# Patient Record
Sex: FEMALE | Race: WHITE | Employment: FULL TIME
[De-identification: names, ages, dates, MRNs, and addresses within clinical notes are randomized per-mention and may not be internally consistent; named-entity substitution may affect disease eponyms.]

---

## 2017-01-19 ENCOUNTER — OFFICE VISIT (OUTPATIENT)
Dept: FAMILY MEDICINE CLINIC | Facility: CLINIC | Age: 52
End: 2017-01-19

## 2017-01-19 VITALS
WEIGHT: 187.8 LBS | OXYGEN SATURATION: 97 % | SYSTOLIC BLOOD PRESSURE: 114 MMHG | HEIGHT: 68 IN | DIASTOLIC BLOOD PRESSURE: 70 MMHG | TEMPERATURE: 98.2 F | HEART RATE: 77 BPM | BODY MASS INDEX: 28.46 KG/M2

## 2017-01-19 DIAGNOSIS — E78.2 MIXED HYPERLIPIDEMIA: ICD-10-CM

## 2017-01-19 DIAGNOSIS — R82.998 DARK URINE: ICD-10-CM

## 2017-01-19 DIAGNOSIS — E11.39 UNCONTROLLED TYPE 2 DIABETES MELLITUS WITH OTHER OPHTHALMIC COMPLICATION, UNSPECIFIED LONG TERM INSULIN USE STATUS: Primary | ICD-10-CM

## 2017-01-19 DIAGNOSIS — E11.65 UNCONTROLLED TYPE 2 DIABETES MELLITUS WITH OTHER OPHTHALMIC COMPLICATION, UNSPECIFIED LONG TERM INSULIN USE STATUS: Primary | ICD-10-CM

## 2017-01-19 LAB
BILIRUBIN, POC: NEGATIVE
BLOOD URINE, POC: NEGATIVE
CLARITY, POC: CLEAR
COLOR, POC: YELLOW
GLUCOSE URINE, POC: >1000
HBA1C MFR BLD: 7.3 %
KETONES, POC: NEGATIVE
LEUKOCYTE EST, POC: NEGATIVE
NITRITE, POC: NEGATIVE
PH, POC: 6
PROTEIN, POC: NEGATIVE
SPECIFIC GRAVITY, POC: 1.02
UROBILINOGEN, POC: 0.2

## 2017-01-19 PROCEDURE — 83036 HEMOGLOBIN GLYCOSYLATED A1C: CPT | Performed by: NURSE PRACTITIONER

## 2017-01-19 PROCEDURE — 3045F PR MOST RECENT HEMOGLOBIN A1C LEVEL 7.0-9.0%: CPT | Performed by: NURSE PRACTITIONER

## 2017-01-19 PROCEDURE — 99213 OFFICE O/P EST LOW 20 MIN: CPT | Performed by: NURSE PRACTITIONER

## 2017-01-19 PROCEDURE — 81003 URINALYSIS AUTO W/O SCOPE: CPT | Performed by: NURSE PRACTITIONER

## 2017-01-19 RX ORDER — LANCETS 33 GAUGE
EACH MISCELLANEOUS
COMMUNITY
Start: 2016-12-06 | End: 2019-04-18

## 2017-01-19 RX ORDER — BLOOD SUGAR DIAGNOSTIC
STRIP MISCELLANEOUS
COMMUNITY
Start: 2016-12-06 | End: 2018-04-16 | Stop reason: SDUPTHER

## 2017-01-19 RX ORDER — ATORVASTATIN CALCIUM 20 MG/1
20 TABLET, FILM COATED ORAL DAILY
Qty: 30 TABLET | Refills: 3 | Status: SHIPPED | OUTPATIENT
Start: 2017-01-19 | End: 2017-06-08 | Stop reason: SDUPTHER

## 2017-01-19 ASSESSMENT — ENCOUNTER SYMPTOMS
VISUAL CHANGE: 0
DIARRHEA: 0
NAUSEA: 0
COUGH: 0
VOMITING: 0
CHEST TIGHTNESS: 0
BLURRED VISION: 0
CONSTIPATION: 0
ABDOMINAL PAIN: 0
SHORTNESS OF BREATH: 0

## 2017-06-08 ENCOUNTER — OFFICE VISIT (OUTPATIENT)
Dept: FAMILY MEDICINE CLINIC | Age: 52
End: 2017-06-08
Payer: COMMERCIAL

## 2017-06-08 VITALS
SYSTOLIC BLOOD PRESSURE: 126 MMHG | OXYGEN SATURATION: 97 % | TEMPERATURE: 98 F | WEIGHT: 189.2 LBS | HEIGHT: 68 IN | HEART RATE: 81 BPM | BODY MASS INDEX: 28.67 KG/M2 | DIASTOLIC BLOOD PRESSURE: 76 MMHG

## 2017-06-08 DIAGNOSIS — Z13.31 DEPRESSION SCREEN: ICD-10-CM

## 2017-06-08 DIAGNOSIS — E78.2 MIXED HYPERLIPIDEMIA: ICD-10-CM

## 2017-06-08 DIAGNOSIS — M25.50 ARTHRALGIA, UNSPECIFIED JOINT: ICD-10-CM

## 2017-06-08 DIAGNOSIS — E11.65 UNCONTROLLED TYPE 2 DIABETES MELLITUS WITH OTHER OPHTHALMIC COMPLICATION, UNSPECIFIED LONG TERM INSULIN USE STATUS: Primary | ICD-10-CM

## 2017-06-08 DIAGNOSIS — E11.39 UNCONTROLLED TYPE 2 DIABETES MELLITUS WITH OTHER OPHTHALMIC COMPLICATION, UNSPECIFIED LONG TERM INSULIN USE STATUS: Primary | ICD-10-CM

## 2017-06-08 LAB — HBA1C MFR BLD: 7.7 %

## 2017-06-08 PROCEDURE — 83036 HEMOGLOBIN GLYCOSYLATED A1C: CPT | Performed by: NURSE PRACTITIONER

## 2017-06-08 PROCEDURE — G8417 CALC BMI ABV UP PARAM F/U: HCPCS | Performed by: NURSE PRACTITIONER

## 2017-06-08 PROCEDURE — 99213 OFFICE O/P EST LOW 20 MIN: CPT | Performed by: NURSE PRACTITIONER

## 2017-06-08 PROCEDURE — 3045F PR MOST RECENT HEMOGLOBIN A1C LEVEL 7.0-9.0%: CPT | Performed by: NURSE PRACTITIONER

## 2017-06-08 RX ORDER — MELOXICAM 7.5 MG/1
7.5 TABLET ORAL DAILY
Qty: 30 TABLET | Refills: 3 | Status: SHIPPED | OUTPATIENT
Start: 2017-06-08 | End: 2017-10-05 | Stop reason: DRUGHIGH

## 2017-06-08 RX ORDER — ATORVASTATIN CALCIUM 20 MG/1
20 TABLET, FILM COATED ORAL DAILY
Qty: 30 TABLET | Refills: 3 | Status: SHIPPED | OUTPATIENT
Start: 2017-06-08 | End: 2017-10-05 | Stop reason: SDUPTHER

## 2017-06-08 ASSESSMENT — ENCOUNTER SYMPTOMS
SHORTNESS OF BREATH: 0
VOMITING: 0
COUGH: 0
NAUSEA: 0
ABDOMINAL PAIN: 0
BACK PAIN: 0
CHEST TIGHTNESS: 0

## 2017-06-08 ASSESSMENT — PATIENT HEALTH QUESTIONNAIRE - PHQ9
SUM OF ALL RESPONSES TO PHQ QUESTIONS 1-9: 0
2. FEELING DOWN, DEPRESSED OR HOPELESS: 0
SUM OF ALL RESPONSES TO PHQ9 QUESTIONS 1 & 2: 0
1. LITTLE INTEREST OR PLEASURE IN DOING THINGS: 0

## 2017-10-05 ENCOUNTER — OFFICE VISIT (OUTPATIENT)
Dept: FAMILY MEDICINE CLINIC | Age: 52
End: 2017-10-05
Payer: COMMERCIAL

## 2017-10-05 VITALS
OXYGEN SATURATION: 98 % | TEMPERATURE: 98.1 F | SYSTOLIC BLOOD PRESSURE: 116 MMHG | BODY MASS INDEX: 28.73 KG/M2 | HEART RATE: 81 BPM | DIASTOLIC BLOOD PRESSURE: 78 MMHG | HEIGHT: 68 IN | WEIGHT: 189.6 LBS

## 2017-10-05 DIAGNOSIS — E55.9 VITAMIN D DEFICIENCY: ICD-10-CM

## 2017-10-05 DIAGNOSIS — Z11.4 ENCOUNTER FOR SCREENING FOR HIV: ICD-10-CM

## 2017-10-05 DIAGNOSIS — Z00.00 ROUTINE GENERAL MEDICAL EXAMINATION AT A HEALTH CARE FACILITY: ICD-10-CM

## 2017-10-05 DIAGNOSIS — Z11.59 NEED FOR HEPATITIS C SCREENING TEST: ICD-10-CM

## 2017-10-05 DIAGNOSIS — E11.39 UNCONTROLLED TYPE 2 DIABETES MELLITUS WITH OTHER OPHTHALMIC COMPLICATION, UNSPECIFIED LONG TERM INSULIN USE STATUS: Primary | ICD-10-CM

## 2017-10-05 DIAGNOSIS — Z23 FLU VACCINE NEED: ICD-10-CM

## 2017-10-05 DIAGNOSIS — E11.65 UNCONTROLLED TYPE 2 DIABETES MELLITUS WITH OTHER OPHTHALMIC COMPLICATION, UNSPECIFIED LONG TERM INSULIN USE STATUS: Primary | ICD-10-CM

## 2017-10-05 DIAGNOSIS — Z23 NEED FOR SHINGLES VACCINE: ICD-10-CM

## 2017-10-05 DIAGNOSIS — E78.2 MIXED HYPERLIPIDEMIA: ICD-10-CM

## 2017-10-05 DIAGNOSIS — E11.65 UNCONTROLLED TYPE 2 DIABETES MELLITUS WITH OTHER OPHTHALMIC COMPLICATION, UNSPECIFIED LONG TERM INSULIN USE STATUS: ICD-10-CM

## 2017-10-05 DIAGNOSIS — E11.39 UNCONTROLLED TYPE 2 DIABETES MELLITUS WITH OTHER OPHTHALMIC COMPLICATION, UNSPECIFIED LONG TERM INSULIN USE STATUS: ICD-10-CM

## 2017-10-05 LAB
CREATININE URINE POCT: 50
HBA1C MFR BLD: 7.6 %
MICROALBUMIN/CREAT 24H UR: 10 MG/G{CREAT}
MICROALBUMIN/CREAT UR-RTO: NORMAL

## 2017-10-05 PROCEDURE — 99213 OFFICE O/P EST LOW 20 MIN: CPT | Performed by: NURSE PRACTITIONER

## 2017-10-05 PROCEDURE — 90630 INFLUENZA, QUADV, 18-64 YRS, ID, PF, MICRO INJ, 0.1ML (FLUZONE QUADV, PF): CPT | Performed by: NURSE PRACTITIONER

## 2017-10-05 PROCEDURE — 3045F PR MOST RECENT HEMOGLOBIN A1C LEVEL 7.0-9.0%: CPT | Performed by: NURSE PRACTITIONER

## 2017-10-05 PROCEDURE — 82044 UR ALBUMIN SEMIQUANTITATIVE: CPT | Performed by: NURSE PRACTITIONER

## 2017-10-05 PROCEDURE — 83036 HEMOGLOBIN GLYCOSYLATED A1C: CPT | Performed by: NURSE PRACTITIONER

## 2017-10-05 PROCEDURE — 90471 IMMUNIZATION ADMIN: CPT | Performed by: NURSE PRACTITIONER

## 2017-10-05 RX ORDER — MELOXICAM 15 MG/1
15 TABLET ORAL DAILY
Qty: 30 TABLET | Refills: 3 | Status: SHIPPED | OUTPATIENT
Start: 2017-10-05 | End: 2018-01-16 | Stop reason: SDUPTHER

## 2017-10-05 RX ORDER — LISINOPRIL 2.5 MG/1
2.5 TABLET ORAL DAILY
Qty: 30 TABLET | Refills: 3 | Status: SHIPPED | OUTPATIENT
Start: 2017-10-05 | End: 2018-01-16 | Stop reason: SDUPTHER

## 2017-10-05 RX ORDER — ATORVASTATIN CALCIUM 20 MG/1
20 TABLET, FILM COATED ORAL DAILY
Qty: 30 TABLET | Refills: 3 | Status: SHIPPED | OUTPATIENT
Start: 2017-10-05 | End: 2018-01-16 | Stop reason: SDUPTHER

## 2017-10-05 ASSESSMENT — ENCOUNTER SYMPTOMS
ABDOMINAL PAIN: 0
VOMITING: 0
SINUS PRESSURE: 1
COUGH: 0
RHINORRHEA: 1
SHORTNESS OF BREATH: 0
NAUSEA: 0
CHEST TIGHTNESS: 0

## 2017-10-05 NOTE — PROGRESS NOTES
microalbuminuria, without long-term current use of insulin ([facility] [address]     3. Mixed hyperlipidemia  Lipid Panel    atorvastatin (LIPITOR) 20 MG tablet   4. Vitamin D deficiency  Vitamin D 25 Hydroxy   5. Flu vaccine need  INFLUENZA, QUADV, 18-64 YRS, ID, PF, MICRO INJ, 0.1ML (FLUZONE QUADV, PF)   6. Encounter for screening for HIV  HIV Screen   7. Need for hepatitis C screening test  Hepatitis C Antibody   8. Routine general medical examination at a health care facility  Lipid Panel    CBC With Auto Differential    Comprehensive Metabolic Panel   9. Need for shingles vaccine  zoster vaccine live, PF, (ZOSTAVAX) 20019 UNT/0.65ML injection   10. 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, unspecified long term insulin use status (HCC)  Empagliflozin-Linagliptin (GLYXAMBI) 25-5 MG TABS    metFORMIN (GLUCOPHAGE) 1000 MG tablet     Results for orders placed or performed in visit on 10/05/17   POCT glycosylated hemoglobin (Hb A1C)   Result Value Ref Range    Hemoglobin A1C 7.6 %   POCT microalbumin   Result Value Ref Range    Microalb, Ur 10     Creatinine Ur POCT 50     Microalb Creat Ratio       Wt Readings from Last 3 Encounters:   10/05/17 189 lb 9.6 oz (86 kg)   06/08/17 189 lb 3.2 oz (85.8 kg)   01/19/17 187 lb 12.8 oz (85.2 kg)       Plan:      Return in about 3 months (around 1/5/2018) for return for diabetes management.   Orders Placed This Encounter   Procedures    INFLUENZA, QUADV, 18-64 YRS, ID, PF, MICRO INJ, 0.1ML (FLUZONE QUADV, PF)    Hepatitis C Antibody     Standing Status:   Future     Standing Expiration Date:   10/5/2018    HIV Screen     Standing Status:   Future     Standing Expiration Date:   10/5/2018    Lipid Panel     Standing Status:   Future     Standing Expiration Date:   10/5/2018     Order Specific Question:   Is Patient Fasting?/# of Hours     Answer:   8-10 hrs    CBC With Auto Differential     Standing Status:   Future     Standing Expiration Date: 10/5/2018    Comprehensive Metabolic Panel     Standing Status:   Future     Standing Expiration Date:   10/5/2018    Vitamin B12     Standing Status:   Future     Standing Expiration Date:   10/5/2018    Vitamin D 25 Hydroxy     Standing Status:   Future     Standing Expiration Date:   10/5/2018    POCT glycosylated hemoglobin (Hb A1C)    POCT microalbumin   pt non complaint with Diabetic diet, advised again to cut out sweets/candy  Continue same meds  Update other labs  Health Maintenance reviewed - Flu shot given  Declined mamm at this time, does not need pap  Low carb, low sugar diet, high fiber/protein  Daily exercise  Add ACE for kidney protection      Orders Placed This Encounter   Medications    zoster vaccine live, PF, (ZOSTAVAX) 84175 UNT/0.65ML injection     Sig: Inject 0.65 mLs into the skin once for 1 dose     Dispense:  0.65 mL     Refill:  0    lisinopril (ZESTRIL) 2.5 MG tablet     Sig: Take 1 tablet by mouth daily     Dispense:  30 tablet     Refill:  3    meloxicam (MOBIC) 15 MG tablet     Sig: Take 1 tablet by mouth daily     Dispense:  30 tablet     Refill:  3    Empagliflozin-Linagliptin (GLYXAMBI) 25-5 MG TABS     Sig: Take 1 tablet by mouth daily     Dispense:  30 tablet     Refill:  3    metFORMIN (GLUCOPHAGE) 1000 MG tablet     Sig: Take 1 tablet by mouth 2 times daily (with meals)     Dispense:  60 tablet     Refill:  3    atorvastatin (LIPITOR) 20 MG tablet     Sig: Take 1 tablet by mouth daily     Dispense:  30 tablet     Refill:  3       Patient given educational materials - see patient instructions. Discussed use, benefit, and side effects of prescribed medications. All patient questions answered. Pt voiced understanding. Reviewed health maintenance. Instructed to continue current medications, diet and exercise.     Electronically signed by Angelita Barber CNP on 10/5/2017 at 2:58 PM

## 2017-10-06 ENCOUNTER — HOSPITAL ENCOUNTER (OUTPATIENT)
Age: 52
Setting detail: SPECIMEN
Discharge: HOME | End: 2017-10-06
Payer: COMMERCIAL

## 2017-10-06 DIAGNOSIS — Z11.59 NEED FOR HEPATITIS C SCREENING TEST: ICD-10-CM

## 2017-10-06 DIAGNOSIS — E78.2 MIXED HYPERLIPIDEMIA: ICD-10-CM

## 2017-10-06 DIAGNOSIS — E11.39 UNCONTROLLED TYPE 2 DIABETES MELLITUS WITH OTHER OPHTHALMIC COMPLICATION, UNSPECIFIED LONG TERM INSULIN USE STATUS: ICD-10-CM

## 2017-10-06 DIAGNOSIS — E55.9 VITAMIN D DEFICIENCY: ICD-10-CM

## 2017-10-06 DIAGNOSIS — Z00.00 ROUTINE GENERAL MEDICAL EXAMINATION AT A HEALTH CARE FACILITY: ICD-10-CM

## 2017-10-06 DIAGNOSIS — Z11.4 ENCOUNTER FOR SCREENING FOR HIV: ICD-10-CM

## 2017-10-06 DIAGNOSIS — E11.65 UNCONTROLLED TYPE 2 DIABETES MELLITUS WITH OTHER OPHTHALMIC COMPLICATION, UNSPECIFIED LONG TERM INSULIN USE STATUS: ICD-10-CM

## 2017-10-06 LAB
ABSOLUTE EOS #: 0.1 K/UL (ref 0–0.4)
ABSOLUTE LYMPH #: 2.9 K/UL (ref 1–4.8)
ABSOLUTE MONO #: 0.4 K/UL (ref 0.1–1.2)
ALBUMIN SERPL-MCNC: 4.4 G/DL (ref 3.5–5.2)
ALBUMIN/GLOBULIN RATIO: 1.3 (ref 1–2.5)
ALP BLD-CCNC: 77 U/L (ref 35–104)
ALT SERPL-CCNC: 21 U/L (ref 5–33)
ANION GAP SERPL CALCULATED.3IONS-SCNC: 16 MMOL/L (ref 9–17)
AST SERPL-CCNC: 16 U/L
BASOPHILS # BLD: 0 %
BASOPHILS ABSOLUTE: 0 K/UL (ref 0–0.2)
BILIRUB SERPL-MCNC: 0.35 MG/DL (ref 0.3–1.2)
BUN BLDV-MCNC: 14 MG/DL (ref 6–20)
BUN/CREAT BLD: ABNORMAL (ref 9–20)
CALCIUM SERPL-MCNC: 9.1 MG/DL (ref 8.6–10.4)
CHLORIDE BLD-SCNC: 103 MMOL/L (ref 98–107)
CHOLESTEROL/HDL RATIO: 4.5
CHOLESTEROL: 192 MG/DL
CO2: 25 MMOL/L (ref 20–31)
CREAT SERPL-MCNC: 0.37 MG/DL (ref 0.5–0.9)
DIFFERENTIAL TYPE: NORMAL
EOSINOPHILS RELATIVE PERCENT: 2 %
GFR AFRICAN AMERICAN: >60 ML/MIN
GFR NON-AFRICAN AMERICAN: >60 ML/MIN
GFR SERPL CREATININE-BSD FRML MDRD: ABNORMAL ML/MIN/{1.73_M2}
GFR SERPL CREATININE-BSD FRML MDRD: ABNORMAL ML/MIN/{1.73_M2}
GLUCOSE BLD-MCNC: 155 MG/DL (ref 70–99)
HCT VFR BLD CALC: 42.5 % (ref 36–46)
HDLC SERPL-MCNC: 43 MG/DL
HEMOGLOBIN: 13.9 G/DL (ref 12–16)
HEPATITIS C ANTIBODY: NONREACTIVE
HIV AG/AB: NONREACTIVE
LDL CHOLESTEROL: 121 MG/DL (ref 0–130)
LYMPHOCYTES # BLD: 45 %
MCH RBC QN AUTO: 27.5 PG (ref 26–34)
MCHC RBC AUTO-ENTMCNC: 32.7 G/DL (ref 31–37)
MCV RBC AUTO: 84.2 FL (ref 80–100)
MONOCYTES # BLD: 7 %
PDW BLD-RTO: 15.4 % (ref 12.5–15.4)
PLATELET # BLD: 218 K/UL (ref 140–450)
PLATELET ESTIMATE: NORMAL
PMV BLD AUTO: 8.3 FL (ref 6–12)
POTASSIUM SERPL-SCNC: 3.9 MMOL/L (ref 3.7–5.3)
RBC # BLD: 5.05 M/UL (ref 4–5.2)
RBC # BLD: NORMAL 10*6/UL
SEG NEUTROPHILS: 46 %
SEGMENTED NEUTROPHILS ABSOLUTE COUNT: 2.9 K/UL (ref 1.8–7.7)
SODIUM BLD-SCNC: 144 MMOL/L (ref 135–144)
TOTAL PROTEIN: 7.8 G/DL (ref 6.4–8.3)
TRIGL SERPL-MCNC: 140 MG/DL
VITAMIN B-12: 393 PG/ML (ref 211–946)
VITAMIN D 25-HYDROXY: 22 NG/ML (ref 30–100)
VLDLC SERPL CALC-MCNC: NORMAL MG/DL (ref 1–30)
WBC # BLD: 6.4 K/UL (ref 3.5–11)
WBC # BLD: NORMAL 10*3/UL

## 2017-12-08 ENCOUNTER — TELEPHONE (OUTPATIENT)
Dept: FAMILY MEDICINE CLINIC | Age: 52
End: 2017-12-08

## 2017-12-08 RX ORDER — AZITHROMYCIN 250 MG/1
TABLET, FILM COATED ORAL
Qty: 1 PACKET | Refills: 0 | Status: SHIPPED | OUTPATIENT
Start: 2017-12-08 | End: 2017-12-18

## 2017-12-08 NOTE — TELEPHONE ENCOUNTER
Patient called stating she is out of town in [facility] and woke up with a cold, coughing up yellow, green mucus. She is requesting a z pack sent the the pharmacy. Please advise.  Thank you

## 2018-01-16 ENCOUNTER — OFFICE VISIT (OUTPATIENT)
Dept: FAMILY MEDICINE CLINIC | Age: 53
End: 2018-01-16
Payer: COMMERCIAL

## 2018-01-16 VITALS
OXYGEN SATURATION: 96 % | BODY MASS INDEX: 28.49 KG/M2 | SYSTOLIC BLOOD PRESSURE: 124 MMHG | HEART RATE: 114 BPM | DIASTOLIC BLOOD PRESSURE: 72 MMHG | TEMPERATURE: 100.8 F | HEIGHT: 68 IN | WEIGHT: 188 LBS

## 2018-01-16 DIAGNOSIS — R50.9 FEVER, UNSPECIFIED FEVER CAUSE: ICD-10-CM

## 2018-01-16 DIAGNOSIS — E11.65 UNCONTROLLED TYPE 2 DIABETES MELLITUS WITH OTHER OPHTHALMIC COMPLICATION, UNSPECIFIED LONG TERM INSULIN USE STATUS: ICD-10-CM

## 2018-01-16 DIAGNOSIS — J10.1 INFLUENZA A: ICD-10-CM

## 2018-01-16 DIAGNOSIS — E78.2 MIXED HYPERLIPIDEMIA: ICD-10-CM

## 2018-01-16 DIAGNOSIS — E11.39 UNCONTROLLED TYPE 2 DIABETES MELLITUS WITH OTHER OPHTHALMIC COMPLICATION, UNSPECIFIED LONG TERM INSULIN USE STATUS: ICD-10-CM

## 2018-01-16 LAB
HBA1C MFR BLD: 7.1 %
INFLUENZA A ANTIBODY: POSITIVE
INFLUENZA B ANTIBODY: NEGATIVE

## 2018-01-16 PROCEDURE — 3045F PR MOST RECENT HEMOGLOBIN A1C LEVEL 7.0-9.0%: CPT | Performed by: NURSE PRACTITIONER

## 2018-01-16 PROCEDURE — 83036 HEMOGLOBIN GLYCOSYLATED A1C: CPT | Performed by: NURSE PRACTITIONER

## 2018-01-16 PROCEDURE — 87804 INFLUENZA ASSAY W/OPTIC: CPT | Performed by: NURSE PRACTITIONER

## 2018-01-16 PROCEDURE — 99214 OFFICE O/P EST MOD 30 MIN: CPT | Performed by: NURSE PRACTITIONER

## 2018-01-16 RX ORDER — ATORVASTATIN CALCIUM 20 MG/1
20 TABLET, FILM COATED ORAL DAILY
Qty: 30 TABLET | Refills: 5 | Status: SHIPPED | OUTPATIENT
Start: 2018-01-16 | End: 2018-07-18 | Stop reason: SDUPTHER

## 2018-01-16 RX ORDER — ALBUTEROL SULFATE 90 UG/1
2 AEROSOL, METERED RESPIRATORY (INHALATION) EVERY 6 HOURS PRN
Qty: 1 INHALER | Refills: 0 | Status: SHIPPED | OUTPATIENT
Start: 2018-01-16 | End: 2019-04-18

## 2018-01-16 RX ORDER — LISINOPRIL 2.5 MG/1
2.5 TABLET ORAL DAILY
Qty: 30 TABLET | Refills: 5 | Status: SHIPPED | OUTPATIENT
Start: 2018-01-16 | End: 2018-07-18 | Stop reason: SDUPTHER

## 2018-01-16 RX ORDER — MELOXICAM 7.5 MG/1
TABLET ORAL
COMMUNITY
Start: 2017-10-10 | End: 2018-01-16 | Stop reason: DRUGHIGH

## 2018-01-16 RX ORDER — OSELTAMIVIR PHOSPHATE 75 MG/1
75 CAPSULE ORAL 2 TIMES DAILY
Qty: 10 CAPSULE | Refills: 0 | Status: SHIPPED | OUTPATIENT
Start: 2018-01-16 | End: 2018-01-21

## 2018-01-16 RX ORDER — MELOXICAM 15 MG/1
15 TABLET ORAL DAILY
Qty: 30 TABLET | Refills: 5 | Status: SHIPPED | OUTPATIENT
Start: 2018-01-16 | End: 2018-07-18 | Stop reason: SDUPTHER

## 2018-01-16 ASSESSMENT — ENCOUNTER SYMPTOMS
NAUSEA: 0
CHEST TIGHTNESS: 1
RHINORRHEA: 0
WHEEZING: 0
COLOR CHANGE: 0
COUGH: 1
SHORTNESS OF BREATH: 1
SINUS PRESSURE: 0
VOMITING: 0
SINUS PAIN: 0
BLURRED VISION: 0

## 2018-01-16 NOTE — PROGRESS NOTES
decreasing steadily. An ACE inhibitor/angiotensin II receptor blocker is being taken. She does not see a podiatrist.Eye exam is not current. Past Medical History:   Diagnosis Date    Hyperlipidemia     Neuropathy ([address]     Type 2 diabetes mellitus (HCC)       Past Surgical History:   Procedure Laterality Date    HYSTERECTOMY      complete     Family History   Problem Relation Age of Onset    Diabetes Father     Cancer Father      prostate, skin, rectal    Diabetes Brother     Diabetes Paternal Aunt      Social History   Substance Use Topics    Smoking status: Former Smoker    Smokeless tobacco: Not on file    Alcohol use No      Current Outpatient Prescriptions   Medication Sig Dispense Refill    oseltamivir (TAMIFLU) 75 MG capsule Take 1 capsule by mouth 2 times daily for 5 days 10 capsule 0    lisinopril (ZESTRIL) 2.5 MG tablet Take 1 tablet by mouth daily 30 tablet 5    meloxicam (MOBIC) 15 MG tablet Take 1 tablet by mouth daily 30 tablet 5    Empagliflozin-Linagliptin (GLYXAMBI) 25-5 MG TABS Take 1 tablet by mouth daily 30 tablet 5    metFORMIN (GLUCOPHAGE) 1000 MG tablet Take 1 tablet by mouth 2 times daily (with meals) 60 tablet 5    atorvastatin (LIPITOR) 20 MG tablet Take 1 tablet by mouth daily 30 tablet 5    albuterol sulfate HFA (VENTOLIN HFA) 108 (90 Base) MCG/ACT inhaler Inhale 2 puffs into the lungs every 6 hours as needed for Wheezing 1 Inhaler 0    ONETOUCH DELICA LANCETS 85A MISC       ONETOUCH VERIO strip       vitamin D (ERGOCALCIFEROL) 82988 UNITS CAPS capsule Take 1 capsule by mouth once a week for 8 doses 8 capsule 0     No current facility-administered medications for this visit.       No Known Allergies    Health Maintenance   Topic Date Due    Diabetic retinal exam  10/24/2017    Diabetic foot exam  11/04/2017    Cervical cancer screen  01/30/2019 (Originally 6/27/1985)    DTaP/Tdap/Td vaccine (1 - Tdap) 10/26/2020 (Originally 6/27/1983)    A1C test (Diabetic or Prediabetic)  10/05/2018    Diabetic microalbuminuria test  10/05/2018    Lipid screen  10/06/2018    Potassium monitoring  10/06/2018    Creatinine monitoring  10/06/2018    Breast cancer screen  10/18/2018    Colon cancer screen colonoscopy  11/15/2026    Flu vaccine  Completed    Pneumococcal med risk  Completed    Hepatitis C screen  Completed    HIV screen  Completed       Subjective:      Review of Systems   Constitutional: Positive for activity change, appetite change, chills, fatigue and fever. HENT: Negative for congestion, postnasal drip, rhinorrhea, sinus pain and sinus pressure. Eyes: Negative for blurred vision. Respiratory: Positive for cough, chest tightness and shortness of breath. Negative for wheezing. Cardiovascular: Negative for chest pain. Gastrointestinal: Negative for nausea and vomiting. Endocrine: Negative for polydipsia, polyphagia and polyuria. Genitourinary: Negative for impotence. Musculoskeletal: Positive for myalgias. Skin: Negative for color change and wound. Neurological: Positive for weakness and headaches. Negative for dizziness and light-headedness. Psychiatric/Behavioral: Positive for sleep disturbance. The patient is not nervous/anxious. Objective:     Physical Exam   Constitutional: She is oriented to person, place, and time. She appears well-developed and well-nourished. No distress. /72 (Site: Left Arm, Position: Sitting, Cuff Size: Medium Adult)   Pulse 114   Temp 100.8 °F (38.2 °C) (Tympanic)   Ht 5' 8\" (1.727 m)   Wt 188 lb (85.3 kg)   SpO2 96%   BMI 28.59 kg/m²      HENT:   Head: Normocephalic and atraumatic. Right Ear: Hearing, tympanic membrane, external ear and ear canal normal.   Left Ear: Hearing, tympanic membrane, external ear and ear canal normal.   Nose: Nose normal. Right sinus exhibits no maxillary sinus tenderness and no frontal sinus tenderness.  Left sinus exhibits no maxillary sinus tenderness and

## 2018-01-16 NOTE — PROGRESS NOTES
Visit Information    Have you changed or started any medications since your last visit including any over-the-counter medicines, vitamins, or herbal medicines? no   Have you stopped taking any of your medications? Is so, why? -  no  Are you having any side effects from any of your medications? - no    Have you seen any other physician or provider since your last visit?  no   Have you had any other diagnostic tests since your last visit?  no   Have you been seen in the emergency room and/or had an admission in a hospital since we last saw you?  no   Have you had your routine dental cleaning in the past 6 months?  no     Do you have an active MyChart account? If no, what is the barrier?   No: na    Patient Care Team:  Sylvain Lawler CNP as PCP - General (Nurse Practitioner)    Medical History Review  Past Medical, Family, and Social History reviewed and  contribute to the patient presenting condition    Health Maintenance   Topic Date Due    Diabetic retinal exam  10/24/2017    Diabetic foot exam  11/04/2017    Cervical cancer screen  01/30/2019 (Originally 6/27/1985)    DTaP/Tdap/Td vaccine (1 - Tdap) 10/26/2020 (Originally 6/27/1983)    A1C test (Diabetic or Prediabetic)  10/05/2018    Diabetic microalbuminuria test  10/05/2018    Lipid screen  10/06/2018    Potassium monitoring  10/06/2018    Creatinine monitoring  10/06/2018    Breast cancer screen  10/18/2018    Colon cancer screen colonoscopy  11/15/2026    Flu vaccine  Completed    Pneumococcal med risk  Completed    Hepatitis C screen  Completed    HIV screen  Completed

## 2018-01-19 ENCOUNTER — TELEPHONE (OUTPATIENT)
Dept: FAMILY MEDICINE CLINIC | Age: 53
End: 2018-01-19

## 2018-01-19 RX ORDER — DEXTROMETHORPHAN HYDROBROMIDE AND PROMETHAZINE HYDROCHLORIDE 15; 6.25 MG/5ML; MG/5ML
5 SYRUP ORAL 4 TIMES DAILY PRN
Qty: 240 ML | Refills: 0 | Status: SHIPPED | OUTPATIENT
Start: 2018-01-19 | End: 2018-01-26

## 2018-01-23 ENCOUNTER — OFFICE VISIT (OUTPATIENT)
Dept: FAMILY MEDICINE CLINIC | Age: 53
End: 2018-01-23
Payer: COMMERCIAL

## 2018-01-23 VITALS
DIASTOLIC BLOOD PRESSURE: 72 MMHG | SYSTOLIC BLOOD PRESSURE: 126 MMHG | WEIGHT: 188.4 LBS | HEIGHT: 68 IN | OXYGEN SATURATION: 98 % | TEMPERATURE: 97.5 F | BODY MASS INDEX: 28.55 KG/M2 | HEART RATE: 79 BPM

## 2018-01-23 DIAGNOSIS — R05.9 COUGH: ICD-10-CM

## 2018-01-23 DIAGNOSIS — J20.9 ACUTE BRONCHITIS, UNSPECIFIED ORGANISM: Primary | ICD-10-CM

## 2018-01-23 PROCEDURE — 99213 OFFICE O/P EST LOW 20 MIN: CPT | Performed by: NURSE PRACTITIONER

## 2018-01-23 RX ORDER — PREDNISONE 10 MG/1
10 TABLET ORAL 2 TIMES DAILY
Qty: 10 TABLET | Refills: 0 | Status: SHIPPED | OUTPATIENT
Start: 2018-01-23 | End: 2018-01-28

## 2018-01-23 ASSESSMENT — ENCOUNTER SYMPTOMS
CHEST TIGHTNESS: 1
SORE THROAT: 0
WHEEZING: 1
SHORTNESS OF BREATH: 1
CHOKING: 0
COUGH: 1
RHINORRHEA: 0
ABDOMINAL PAIN: 0
TROUBLE SWALLOWING: 0
SINUS PRESSURE: 0

## 2018-01-23 NOTE — PROGRESS NOTES
Negative for myalgias. Neurological: Negative for dizziness, weakness, light-headedness and headaches. Hematological: Negative for adenopathy. Psychiatric/Behavioral: Positive for sleep disturbance. Objective:     Physical Exam   Constitutional: She is oriented to person, place, and time. She appears well-developed and well-nourished. No distress. /72 (Site: Left Arm, Position: Sitting, Cuff Size: Medium Adult)   Pulse 79   Temp 97.5 °F (36.4 °C) (Tympanic)   Ht 5' 8\" (1.727 m)   Wt 188 lb 6.4 oz (85.5 kg)   SpO2 98%   BMI 28.65 kg/m²      HENT:   Head: Normocephalic and atraumatic. Right Ear: Hearing, tympanic membrane, external ear and ear canal normal.   Left Ear: Hearing, tympanic membrane, external ear and ear canal normal.   Nose: Nose normal. Right sinus exhibits no maxillary sinus tenderness and no frontal sinus tenderness. Left sinus exhibits no maxillary sinus tenderness and no frontal sinus tenderness. Mouth/Throat: Uvula is midline, oropharynx is clear and moist and mucous membranes are normal. No oropharyngeal exudate. Neck: Normal range of motion. Neck supple. Cardiovascular: Normal rate, regular rhythm and normal heart sounds. Pulmonary/Chest: Effort normal and breath sounds normal. No respiratory distress. She has no wheezes. Lymphadenopathy:     She has no cervical adenopathy. Neurological: She is alert and oriented to person, place, and time. Skin: Skin is warm and dry. No rash noted. She is not diaphoretic. Psychiatric: She has a normal mood and affect. Her behavior is normal. Judgment and thought content normal.   Nursing note and vitals reviewed. Assessment:      1. Acute bronchitis, unspecified organism  predniSONE (DELTASONE) 10 MG tablet   2. Cough  XR CHEST STANDARD (2 VW)    predniSONE (DELTASONE) 10 MG tablet     Cxr: reviewed today and no acute changes   will await for final rad report    Plan:      No Follow-up on file.   Orders Placed This Encounter   Procedures    XR CHEST STANDARD (2 VW)     Order Specific Question:   Reason for exam:     Answer:   cough, recent influenza, sob     Orders Placed This Encounter   Medications    predniSONE (DELTASONE) 10 MG tablet     Sig: Take 1 tablet by mouth 2 times daily for 5 days     Dispense:  10 tablet     Refill:  0   push fluids, rest  Continue albuterol  Cool mist vaporizer  Steroids for underlying bronchitis  Call INB or worsening  Frequent handwashing      Patient given educational materials - see patient instructions. Discussed use, benefit, and side effects of prescribed medications. All patient questions answered. Pt voiced understanding. Reviewed health maintenance. Instructed to continue current medications, diet and exercise.     Electronically signed by Ramirez Barber CNP on 1/23/2018 at 9:40 AM

## 2018-04-16 ENCOUNTER — OFFICE VISIT (OUTPATIENT)
Dept: FAMILY MEDICINE CLINIC | Age: 53
End: 2018-04-16
Payer: COMMERCIAL

## 2018-04-16 VITALS
OXYGEN SATURATION: 98 % | HEART RATE: 93 BPM | BODY MASS INDEX: 28.79 KG/M2 | TEMPERATURE: 98.4 F | WEIGHT: 190 LBS | DIASTOLIC BLOOD PRESSURE: 68 MMHG | HEIGHT: 68 IN | SYSTOLIC BLOOD PRESSURE: 112 MMHG

## 2018-04-16 DIAGNOSIS — M54.32 LEFT SIDED SCIATICA: ICD-10-CM

## 2018-04-16 LAB — HBA1C MFR BLD: 7.8 %

## 2018-04-16 PROCEDURE — 83036 HEMOGLOBIN GLYCOSYLATED A1C: CPT | Performed by: NURSE PRACTITIONER

## 2018-04-16 PROCEDURE — 3045F PR MOST RECENT HEMOGLOBIN A1C LEVEL 7.0-9.0%: CPT | Performed by: NURSE PRACTITIONER

## 2018-04-16 PROCEDURE — 99213 OFFICE O/P EST LOW 20 MIN: CPT | Performed by: NURSE PRACTITIONER

## 2018-04-16 PROCEDURE — 96372 THER/PROPH/DIAG INJ SC/IM: CPT | Performed by: NURSE PRACTITIONER

## 2018-04-16 RX ORDER — BLOOD SUGAR DIAGNOSTIC
1 STRIP MISCELLANEOUS DAILY
Qty: 100 EACH | Refills: 1 | Status: SHIPPED | OUTPATIENT
Start: 2018-04-16 | End: 2019-07-22 | Stop reason: SDUPTHER

## 2018-04-16 RX ORDER — TRIAMCINOLONE ACETONIDE 40 MG/ML
60 INJECTION, SUSPENSION INTRA-ARTICULAR; INTRAMUSCULAR ONCE
Status: COMPLETED | OUTPATIENT
Start: 2018-04-16 | End: 2018-04-16

## 2018-04-16 RX ADMIN — TRIAMCINOLONE ACETONIDE 60 MG: 40 INJECTION, SUSPENSION INTRA-ARTICULAR; INTRAMUSCULAR at 11:28

## 2018-04-16 ASSESSMENT — ENCOUNTER SYMPTOMS
NAUSEA: 0
COUGH: 0
SHORTNESS OF BREATH: 0
CHEST TIGHTNESS: 0
VOMITING: 0
BACK PAIN: 1
ABDOMINAL PAIN: 0

## 2018-07-18 ENCOUNTER — OFFICE VISIT (OUTPATIENT)
Dept: FAMILY MEDICINE CLINIC | Age: 53
End: 2018-07-18
Payer: COMMERCIAL

## 2018-07-18 VITALS
DIASTOLIC BLOOD PRESSURE: 76 MMHG | TEMPERATURE: 98.4 F | BODY MASS INDEX: 29.1 KG/M2 | SYSTOLIC BLOOD PRESSURE: 124 MMHG | HEART RATE: 86 BPM | HEIGHT: 68 IN | WEIGHT: 192 LBS | OXYGEN SATURATION: 98 %

## 2018-07-18 DIAGNOSIS — M25.512 CHRONIC PAIN OF BOTH SHOULDERS: ICD-10-CM

## 2018-07-18 DIAGNOSIS — E78.2 MIXED HYPERLIPIDEMIA: ICD-10-CM

## 2018-07-18 DIAGNOSIS — Z12.39 SCREENING FOR BREAST CANCER: ICD-10-CM

## 2018-07-18 DIAGNOSIS — E11.39 UNCONTROLLED TYPE 2 DIABETES MELLITUS WITH OTHER OPHTHALMIC COMPLICATION, UNSPECIFIED LONG TERM INSULIN USE STATUS: ICD-10-CM

## 2018-07-18 DIAGNOSIS — G89.29 CHRONIC PAIN OF BOTH SHOULDERS: ICD-10-CM

## 2018-07-18 DIAGNOSIS — E11.65 UNCONTROLLED TYPE 2 DIABETES MELLITUS WITH OTHER OPHTHALMIC COMPLICATION, UNSPECIFIED LONG TERM INSULIN USE STATUS: ICD-10-CM

## 2018-07-18 DIAGNOSIS — M54.32 LEFT SIDED SCIATICA: ICD-10-CM

## 2018-07-18 DIAGNOSIS — M25.511 CHRONIC PAIN OF BOTH SHOULDERS: ICD-10-CM

## 2018-07-18 LAB — HBA1C MFR BLD: 7.3 %

## 2018-07-18 PROCEDURE — 3045F PR MOST RECENT HEMOGLOBIN A1C LEVEL 7.0-9.0%: CPT | Performed by: NURSE PRACTITIONER

## 2018-07-18 PROCEDURE — 96372 THER/PROPH/DIAG INJ SC/IM: CPT | Performed by: NURSE PRACTITIONER

## 2018-07-18 PROCEDURE — 99214 OFFICE O/P EST MOD 30 MIN: CPT | Performed by: NURSE PRACTITIONER

## 2018-07-18 PROCEDURE — 83036 HEMOGLOBIN GLYCOSYLATED A1C: CPT | Performed by: NURSE PRACTITIONER

## 2018-07-18 RX ORDER — LISINOPRIL 2.5 MG/1
2.5 TABLET ORAL DAILY
Qty: 30 TABLET | Refills: 5 | Status: SHIPPED | OUTPATIENT
Start: 2018-07-18 | End: 2019-01-21 | Stop reason: SDUPTHER

## 2018-07-18 RX ORDER — TRIAMCINOLONE ACETONIDE 40 MG/ML
60 INJECTION, SUSPENSION INTRA-ARTICULAR; INTRAMUSCULAR ONCE
Status: COMPLETED | OUTPATIENT
Start: 2018-07-18 | End: 2018-07-18

## 2018-07-18 RX ORDER — ATORVASTATIN CALCIUM 20 MG/1
20 TABLET, FILM COATED ORAL DAILY
Qty: 30 TABLET | Refills: 5 | Status: SHIPPED | OUTPATIENT
Start: 2018-07-18 | End: 2019-01-21 | Stop reason: SDUPTHER

## 2018-07-18 RX ORDER — MELOXICAM 15 MG/1
15 TABLET ORAL DAILY
Qty: 30 TABLET | Refills: 5 | Status: SHIPPED | OUTPATIENT
Start: 2018-07-18 | End: 2019-01-21 | Stop reason: SDUPTHER

## 2018-07-18 RX ADMIN — TRIAMCINOLONE ACETONIDE 60 MG: 40 INJECTION, SUSPENSION INTRA-ARTICULAR; INTRAMUSCULAR at 14:05

## 2018-07-18 ASSESSMENT — ENCOUNTER SYMPTOMS
BOWEL INCONTINENCE: 0
NAUSEA: 0
CHEST TIGHTNESS: 0
COUGH: 0
DIARRHEA: 0
CONSTIPATION: 0
ABDOMINAL PAIN: 0
BACK PAIN: 1
VOMITING: 0
SHORTNESS OF BREATH: 0

## 2018-07-18 ASSESSMENT — PATIENT HEALTH QUESTIONNAIRE - PHQ9
1. LITTLE INTEREST OR PLEASURE IN DOING THINGS: 1
SUM OF ALL RESPONSES TO PHQ QUESTIONS 1-9: 2
SUM OF ALL RESPONSES TO PHQ9 QUESTIONS 1 & 2: 2
2. FEELING DOWN, DEPRESSED OR HOPELESS: 1

## 2018-07-18 NOTE — PROGRESS NOTES
Visit Information    Have you changed or started any medications since your last visit including any over-the-counter medicines, vitamins, or herbal medicines? no   Have you stopped taking any of your medications? Is so, why? -  no  Are you having any side effects from any of your medications? - no    Have you seen any other physician or provider since your last visit?  no   Have you had any other diagnostic tests since your last visit?  no   Have you been seen in the emergency room and/or had an admission in a hospital since we last saw you?  no   Have you had your routine dental cleaning in the past 6 months?  no     Do you have an active MyChart account? If no, what is the barrier? Yes    Patient Care Team:  LORNA Cooper - CNP as PCP - General (Nurse Practitioner)    Medical History Review  Past Medical, Family, and Social History reviewed and  contribute to the patient presenting condition    Health Maintenance   Topic Date Due    Shingles Vaccine (1 of 2 - 2 Dose Series) 06/27/2014    Cervical cancer screen  01/30/2019 (Originally 6/27/1985)    DTaP/Tdap/Td vaccine (1 - Tdap) 10/26/2020 (Originally 6/27/1983)    Flu vaccine (1) 09/01/2018    Diabetic microalbuminuria test  10/05/2018    Lipid screen  10/06/2018    Potassium monitoring  10/06/2018    Creatinine monitoring  10/06/2018    Breast cancer screen  10/18/2018    Diabetic retinal exam  03/05/2019    Diabetic foot exam  04/16/2019    A1C test (Diabetic or Prediabetic)  04/16/2019    Colon cancer screen colonoscopy  11/15/2026    Pneumococcal med risk  Completed    Hepatitis C screen  Completed    HIV screen  Completed     After obtaining consent, and per orders of Julisa Barber CNP, injection of Kenalog given in Left upper quad. gluteus by Inés Arias. Patient instructed to remain in clinic for 20 minutes afterwards, and to report any adverse reaction to me immediately.

## 2018-07-18 NOTE — PROGRESS NOTES
[address], [address]  [phone]      Saroj Pandey is a 46 y.o. female who presents today for her  medical conditions/complaints as noted below. Saroj Pandey is c/o of Diabetes (less motivated lately,very tired)  . HPI:   Would like refills on all meds, tolerating all well with no side effects. Diabetes   She presents for her follow-up diabetic visit. She has type 2 diabetes mellitus. Her disease course has been stable. There are no hypoglycemic associated symptoms. Pertinent negatives for hypoglycemia include no dizziness, headaches or nervousness/anxiousness. Associated symptoms include fatigue. Pertinent negatives for diabetes include no chest pain, no polydipsia, no polyphagia, no polyuria, no weakness and no weight loss. There are no hypoglycemic complications. Pertinent negatives for diabetic complications include no CVA, heart disease, nephropathy, peripheral neuropathy or retinopathy. Risk factors for coronary artery disease include sedentary lifestyle, post-menopausal, obesity and dyslipidemia. Current diabetic treatment includes oral agent (triple therapy) and diet. She is compliant with treatment all of the time. Her weight is stable. She is following a generally healthy diet. When asked about meal planning, she reported none. She participates in exercise intermittently. An ACE inhibitor/angiotensin II receptor blocker is being taken. She does not see a podiatrist.Eye exam is current. Back Pain   This is a recurrent problem. The current episode started 1 to 4 weeks ago. The problem occurs constantly. The problem has been waxing and waning since onset. The pain is present in the sacro-iliac. The quality of the pain is described as aching, burning and shooting. The pain radiates to the left knee. The pain is moderate. The pain is the same all the time. The symptoms are aggravated by twisting, standing, lying down and position. Associated symptoms include numbness (left knee) and tingling. Pertinent negatives include no abdominal pain, bladder incontinence, bowel incontinence, chest pain, dysuria, fever, headaches, paresis, paresthesias, pelvic pain, weakness or weight loss. Risk factors include sedentary lifestyle and lack of exercise. She has tried home exercises, analgesics, ice, NSAIDs and walking for the symptoms. The treatment provided no relief (did have steroid inj last OV and helped alot).        Past Medical History:   Diagnosis Date    Chronic pain of both shoulders 7/18/2018    Hyperlipidemia     Left sided sciatica 7/18/2018    Neuropathy (HCC)     Type 2 diabetes mellitus (HCC)       Past Surgical History:   Procedure Laterality Date    HYSTERECTOMY      complete     Family History   Problem Relation Age of Onset    No Known Problems Mother     Diabetes Father     Cancer Father         prostate, skin, rectal    Diabetes Brother     Diabetes Paternal Aunt      Social History   Substance Use Topics    Smoking status: Former Smoker    Smokeless tobacco: Never Used    Alcohol use No      Current Outpatient Prescriptions   Medication Sig Dispense Refill    lisinopril (ZESTRIL) 2.5 MG tablet Take 1 tablet by mouth daily 30 tablet 5    meloxicam (MOBIC) 15 MG tablet Take 1 tablet by mouth daily 30 tablet 5    Empagliflozin-Linagliptin (GLYXAMBI) 25-5 MG TABS Take 1 tablet by mouth daily 30 tablet 5    metFORMIN (GLUCOPHAGE) 1000 MG tablet Take 1 tablet by mouth 2 times daily (with meals) 60 tablet 5    atorvastatin (LIPITOR) 20 MG tablet Take 1 tablet by mouth daily 30 tablet 5    ONETOUCH VERIO strip Inject 1 each into the skin daily 100 each 1    albuterol sulfate HFA (VENTOLIN HFA) 108 (90 Base) MCG/ACT inhaler Inhale 2 puffs into the lungs every 6 hours as needed for Wheezing 1 Inhaler 0    ONETOUCH DELICA LANCETS 32M MISC       vitamin D (ERGOCALCIFEROL) 22569 UNITS CAPS capsule Take 1 capsule by mouth MG tablet   5. Mixed hyperlipidemia  atorvastatin (LIPITOR) 20 MG tablet   6. Chronic pain of both shoulders  meloxicam (MOBIC) 15 MG tablet       Chemistry        Component Value Date/Time     10/06/2017 0951    K 3.9 10/06/2017 0951     10/06/2017 0951    CO2 25 10/06/2017 0951    BUN 14 10/06/2017 0951    CREATININE 0.37 (L) 10/06/2017 0951        Component Value Date/Time    CALCIUM 9.1 10/06/2017 0951    ALKPHOS 77 10/06/2017 0951    AST 16 10/06/2017 0951    ALT 21 10/06/2017 0951    BILITOT 0.35 10/06/2017 0951        Wt Readings from Last 3 Encounters:   07/18/18 192 lb (87.1 kg)   04/16/18 190 lb (86.2 kg)   01/23/18 188 lb 6.4 oz (85.5 kg)     Results for orders placed or performed in visit on 07/18/18   POCT glycosylated hemoglobin (Hb A1C)   Result Value Ref Range    Hemoglobin A1C 7.3 %       Plan:      Return in about 3 months (around 10/18/2018) for return for diabetes management.   Orders Placed This Encounter   Procedures    MILES DIGITAL SCREEN W CAD BILATERAL     Standing Status:   Future     Standing Expiration Date:   9/17/2019     Order Specific Question:   Reason for exam:     Answer:   screening    POCT glycosylated hemoglobin (Hb A1C)     Orders Placed This Encounter   Medications    triamcinolone acetonide (KENALOG-40) injection 60 mg    lisinopril (ZESTRIL) 2.5 MG tablet     Sig: Take 1 tablet by mouth daily     Dispense:  30 tablet     Refill:  5    meloxicam (MOBIC) 15 MG tablet     Sig: Take 1 tablet by mouth daily     Dispense:  30 tablet     Refill:  5    Empagliflozin-Linagliptin (GLYXAMBI) 25-5 MG TABS     Sig: Take 1 tablet by mouth daily     Dispense:  30 tablet     Refill:  5    metFORMIN (GLUCOPHAGE) 1000 MG tablet     Sig: Take 1 tablet by mouth 2 times daily (with meals)     Dispense:  60 tablet     Refill:  5    atorvastatin (LIPITOR) 20 MG tablet     Sig: Take 1 tablet by mouth daily     Dispense:  30 tablet     Refill:  5     DM:  Uncontrolled but

## 2018-08-06 ENCOUNTER — HOSPITAL ENCOUNTER (OUTPATIENT)
Dept: MAMMOGRAPHY | Facility: CLINIC | Age: 53
Discharge: HOME | End: 2018-08-08
Payer: COMMERCIAL

## 2018-08-06 DIAGNOSIS — Z12.39 SCREENING FOR BREAST CANCER: ICD-10-CM

## 2018-08-06 PROCEDURE — 77067 SCR MAMMO BI INCL CAD: CPT

## 2019-01-21 ENCOUNTER — OFFICE VISIT (OUTPATIENT)
Dept: FAMILY MEDICINE CLINIC | Age: 54
End: 2019-01-21
Payer: COMMERCIAL

## 2019-01-21 VITALS
HEIGHT: 68 IN | SYSTOLIC BLOOD PRESSURE: 124 MMHG | OXYGEN SATURATION: 98 % | HEART RATE: 82 BPM | WEIGHT: 190 LBS | TEMPERATURE: 98 F | DIASTOLIC BLOOD PRESSURE: 75 MMHG | BODY MASS INDEX: 28.79 KG/M2

## 2019-01-21 DIAGNOSIS — E55.9 VITAMIN D DEFICIENCY: ICD-10-CM

## 2019-01-21 DIAGNOSIS — M25.512 CHRONIC PAIN OF BOTH SHOULDERS: ICD-10-CM

## 2019-01-21 DIAGNOSIS — G89.29 CHRONIC PAIN OF BOTH SHOULDERS: ICD-10-CM

## 2019-01-21 DIAGNOSIS — R30.0 DYSURIA: ICD-10-CM

## 2019-01-21 DIAGNOSIS — E11.65 UNCONTROLLED TYPE 2 DIABETES MELLITUS WITH HYPERGLYCEMIA (HCC): Primary | ICD-10-CM

## 2019-01-21 DIAGNOSIS — E78.2 MIXED HYPERLIPIDEMIA: ICD-10-CM

## 2019-01-21 DIAGNOSIS — M25.511 CHRONIC PAIN OF BOTH SHOULDERS: ICD-10-CM

## 2019-01-21 DIAGNOSIS — M75.22 BICEPS TENDINITIS OF LEFT UPPER EXTREMITY: ICD-10-CM

## 2019-01-21 LAB
BILIRUBIN, POC: NEGATIVE
BLOOD URINE, POC: NEGATIVE
CLARITY, POC: CLEAR
COLOR, POC: YELLOW
CREATININE URINE POCT: 50
GLUCOSE URINE, POC: >=1000
HBA1C MFR BLD: 7.5 %
KETONES, POC: NEGATIVE
LEUKOCYTE EST, POC: NEGATIVE
MICROALBUMIN/CREAT 24H UR: 30 MG/G{CREAT}
MICROALBUMIN/CREAT UR-RTO: NORMAL
NITRITE, POC: NEGATIVE
PH, POC: 5
PROTEIN, POC: NEGATIVE
SPECIFIC GRAVITY, POC: 1.01
UROBILINOGEN, POC: 0.2

## 2019-01-21 PROCEDURE — 82044 UR ALBUMIN SEMIQUANTITATIVE: CPT | Performed by: NURSE PRACTITIONER

## 2019-01-21 PROCEDURE — 81003 URINALYSIS AUTO W/O SCOPE: CPT | Performed by: NURSE PRACTITIONER

## 2019-01-21 PROCEDURE — 3045F PR MOST RECENT HEMOGLOBIN A1C LEVEL 7.0-9.0%: CPT | Performed by: NURSE PRACTITIONER

## 2019-01-21 PROCEDURE — 99214 OFFICE O/P EST MOD 30 MIN: CPT | Performed by: NURSE PRACTITIONER

## 2019-01-21 PROCEDURE — 83036 HEMOGLOBIN GLYCOSYLATED A1C: CPT | Performed by: NURSE PRACTITIONER

## 2019-01-21 RX ORDER — ATORVASTATIN CALCIUM 20 MG/1
20 TABLET, FILM COATED ORAL DAILY
Qty: 30 TABLET | Refills: 5 | Status: SHIPPED | OUTPATIENT
Start: 2019-01-21 | End: 2019-07-26 | Stop reason: SDUPTHER

## 2019-01-21 RX ORDER — LISINOPRIL 2.5 MG/1
2.5 TABLET ORAL DAILY
Qty: 30 TABLET | Refills: 5 | Status: SHIPPED | OUTPATIENT
Start: 2019-01-21 | End: 2019-07-26 | Stop reason: SDUPTHER

## 2019-01-21 RX ORDER — MELOXICAM 15 MG/1
15 TABLET ORAL DAILY
Qty: 30 TABLET | Refills: 5 | Status: ON HOLD | OUTPATIENT
Start: 2019-01-21 | End: 2019-05-22 | Stop reason: HOSPADM

## 2019-01-21 RX ORDER — GLIMEPIRIDE 2 MG/1
2 TABLET ORAL EVERY MORNING
Qty: 30 TABLET | Refills: 2 | Status: SHIPPED | OUTPATIENT
Start: 2019-01-21 | End: 2019-04-16 | Stop reason: SDUPTHER

## 2019-01-21 ASSESSMENT — ENCOUNTER SYMPTOMS
VOMITING: 0
COLOR CHANGE: 0
NAUSEA: 0
SHORTNESS OF BREATH: 0
ABDOMINAL PAIN: 0
COUGH: 0
CHEST TIGHTNESS: 0
BACK PAIN: 0

## 2019-01-24 ENCOUNTER — HOSPITAL ENCOUNTER (OUTPATIENT)
Dept: GENERAL RADIOLOGY | Facility: CLINIC | Age: 54
Discharge: HOME | End: 2019-01-26
Payer: COMMERCIAL

## 2019-01-24 ENCOUNTER — OFFICE VISIT (OUTPATIENT)
Dept: ORTHOPEDIC SURGERY | Age: 54
End: 2019-01-24
Payer: COMMERCIAL

## 2019-01-24 VITALS
SYSTOLIC BLOOD PRESSURE: 117 MMHG | DIASTOLIC BLOOD PRESSURE: 82 MMHG | HEART RATE: 76 BPM | WEIGHT: 190 LBS | HEIGHT: 68 IN | BODY MASS INDEX: 28.79 KG/M2

## 2019-01-24 DIAGNOSIS — M75.22 BICEPS TENDONITIS ON LEFT: ICD-10-CM

## 2019-01-24 DIAGNOSIS — M75.102 ROTATOR CUFF SYNDROME OF LEFT SHOULDER: Primary | ICD-10-CM

## 2019-01-24 LAB
ALBUMIN SERPL-MCNC: 4.4 G/DL
ALP BLD-CCNC: 71 U/L
ALT SERPL-CCNC: 18 U/L
ANION GAP SERPL CALCULATED.3IONS-SCNC: 12 MMOL/L
AST SERPL-CCNC: 17 U/L
BASOPHILS ABSOLUTE: 0 /ΜL
BASOPHILS RELATIVE PERCENT: 0.5 %
BILIRUB SERPL-MCNC: 0.5 MG/DL (ref 0.1–1.4)
BUN BLDV-MCNC: 15 MG/DL
CALCIUM SERPL-MCNC: 9.7 MG/DL
CHLORIDE BLD-SCNC: 102 MMOL/L
CHOLESTEROL, TOTAL: 146 MG/DL
CHOLESTEROL/HDL RATIO: 3.9
CO2: 26 MMOL/L
CREAT SERPL-MCNC: 0.6 MG/DL
EOSINOPHILS ABSOLUTE: 0.2 /ΜL
EOSINOPHILS RELATIVE PERCENT: 2.7 %
GFR CALCULATED: >60
GLUCOSE BLD-MCNC: 171 MG/DL
HCT VFR BLD CALC: 43.8 % (ref 36–46)
HDLC SERPL-MCNC: 37 MG/DL (ref 35–70)
HEMOGLOBIN: 14.4 G/DL (ref 12–16)
LDL CHOLESTEROL CALCULATED: 88 MG/DL (ref 0–160)
LYMPHOCYTES ABSOLUTE: 3.8 /ΜL
LYMPHOCYTES RELATIVE PERCENT: 41.9 %
MCH RBC QN AUTO: 28 PG
MCHC RBC AUTO-ENTMCNC: 32.8 G/DL
MCV RBC AUTO: 85 FL
MONOCYTES ABSOLUTE: 0.5 /ΜL
MONOCYTES RELATIVE PERCENT: 5.9 %
NEUTROPHILS ABSOLUTE: 4.5 /ΜL
NEUTROPHILS RELATIVE PERCENT: 49 %
PDW BLD-RTO: 14.3 %
PLATELET # BLD: 260 K/ΜL
PMV BLD AUTO: 8.8 FL
POTASSIUM SERPL-SCNC: 4.6 MMOL/L
RBC # BLD: 5.14 10^6/ΜL
SODIUM BLD-SCNC: 140 MMOL/L
TOTAL PROTEIN: 7.5
TRIGL SERPL-MCNC: 106 MG/DL
VITAMIN D 25-HYDROXY: 39.7
VITAMIN D2, 25 HYDROXY: NORMAL
VITAMIN D3,25 HYDROXY: NORMAL
VLDLC SERPL CALC-MCNC: 21 MG/DL
WBC # BLD: 9.2 10^3/ML

## 2019-01-24 PROCEDURE — 73030 X-RAY EXAM OF SHOULDER: CPT

## 2019-01-24 PROCEDURE — 99203 OFFICE O/P NEW LOW 30 MIN: CPT | Performed by: FAMILY MEDICINE

## 2019-01-25 DIAGNOSIS — E78.2 MIXED HYPERLIPIDEMIA: ICD-10-CM

## 2019-01-25 DIAGNOSIS — E11.65 UNCONTROLLED TYPE 2 DIABETES MELLITUS WITH HYPERGLYCEMIA (HCC): ICD-10-CM

## 2019-01-25 DIAGNOSIS — E55.9 VITAMIN D DEFICIENCY: ICD-10-CM

## 2019-03-26 ENCOUNTER — TELEPHONE (OUTPATIENT)
Dept: FAMILY MEDICINE CLINIC | Age: 54
End: 2019-03-26

## 2019-04-16 DIAGNOSIS — E11.65 UNCONTROLLED TYPE 2 DIABETES MELLITUS WITH HYPERGLYCEMIA (HCC): ICD-10-CM

## 2019-04-16 RX ORDER — GLIMEPIRIDE 2 MG/1
2 TABLET ORAL EVERY MORNING
Qty: 30 TABLET | Refills: 0 | Status: SHIPPED | OUTPATIENT
Start: 2019-04-16 | End: 2019-04-24 | Stop reason: DRUGHIGH

## 2019-04-18 ENCOUNTER — OFFICE VISIT (OUTPATIENT)
Dept: FAMILY MEDICINE CLINIC | Age: 54
End: 2019-04-18
Payer: COMMERCIAL

## 2019-04-18 VITALS
BODY MASS INDEX: 29.55 KG/M2 | HEART RATE: 107 BPM | WEIGHT: 195 LBS | HEIGHT: 68 IN | OXYGEN SATURATION: 93 % | TEMPERATURE: 98.8 F | SYSTOLIC BLOOD PRESSURE: 124 MMHG | DIASTOLIC BLOOD PRESSURE: 88 MMHG

## 2019-04-18 DIAGNOSIS — R09.81 NASAL CONGESTION: Primary | ICD-10-CM

## 2019-04-18 DIAGNOSIS — M25.512 LEFT SHOULDER PAIN, UNSPECIFIED CHRONICITY: ICD-10-CM

## 2019-04-18 DIAGNOSIS — R05.9 COUGH: ICD-10-CM

## 2019-04-18 PROCEDURE — 99213 OFFICE O/P EST LOW 20 MIN: CPT | Performed by: NURSE PRACTITIONER

## 2019-04-18 RX ORDER — AMOXICILLIN 875 MG/1
875 TABLET, COATED ORAL 2 TIMES DAILY
Qty: 20 TABLET | Refills: 0 | Status: SHIPPED | OUTPATIENT
Start: 2019-04-18 | End: 2019-04-28

## 2019-04-18 ASSESSMENT — ENCOUNTER SYMPTOMS
RHINORRHEA: 1
HOARSE VOICE: 0
COUGH: 1
CHEST TIGHTNESS: 0
NAUSEA: 0
TROUBLE SWALLOWING: 0
SINUS PRESSURE: 1
SINUS PAIN: 1
SWOLLEN GLANDS: 1
SORE THROAT: 0
VOMITING: 0
ABDOMINAL PAIN: 0
SHORTNESS OF BREATH: 1

## 2019-04-18 ASSESSMENT — PATIENT HEALTH QUESTIONNAIRE - PHQ9
SUM OF ALL RESPONSES TO PHQ QUESTIONS 1-9: 0
1. LITTLE INTEREST OR PLEASURE IN DOING THINGS: 0
SUM OF ALL RESPONSES TO PHQ QUESTIONS 1-9: 0
2. FEELING DOWN, DEPRESSED OR HOPELESS: 0
SUM OF ALL RESPONSES TO PHQ9 QUESTIONS 1 & 2: 0

## 2019-04-18 NOTE — PATIENT INSTRUCTIONS
washes. Where can you learn more? Go to [email or web address] and sign in to your [employer] account. Enter [phone] in the [facility] box to learn more about \"Saline Nasal Washes: Care Instructions. \"     If you do not have an account, please click on the \"Sign Up Now\" link. Current as of: March 27, 2018  Content Version: 11.9  © 0407-6613 [employer]. Care instructions adapted under license by [facility] [place] ([facility]). If you have questions about a medical condition or this instruction, always ask your healthcare professional. [address] any warranty or liability for your use of this information.

## 2019-04-18 NOTE — PROGRESS NOTES
Visit Information    Have you changed or started any medications since your last visit including any over-the-counter medicines, vitamins, or herbal medicines? no   Have you stopped taking any of your medications? Is so, why? -  no  Are you having any side effects from any of your medications? - no    Have you seen any other physician or provider since your last visit?  no   Have you had any other diagnostic tests since your last visit?  no   Have you been seen in the emergency room and/or had an admission in a hospital since we last saw you?  no   Have you had your routine dental cleaning in the past 6 months?  no     Do you have an active MyChart account? If no, what is the barrier?   Yes    Patient Care Team:  LORNA Rodriguez CNP as PCP - General (Nurse Practitioner)    Medical History Review  Past Medical, Family, and Social History reviewed and  contribute to the patient presenting condition    Health Maintenance   Topic Date Due    Hepatitis B Vaccine (1 of 3 - Risk 3-dose series) 06/27/1983    Cervical cancer screen  06/27/1985    Diabetic foot exam  04/16/2019    Shingles Vaccine (2 of 3) 07/10/2020 (Originally 12/5/2017)    DTaP/Tdap/Td vaccine (1 - Tdap) 10/26/2020 (Originally 6/27/1983)    Flu vaccine (Season Ended) 09/01/2019    A1C test (Diabetic or Prediabetic)  01/21/2020    Diabetic microalbuminuria test  01/21/2020    Lipid screen  01/24/2020    Potassium monitoring  01/24/2020    Creatinine monitoring  01/24/2020    Diabetic retinal exam  03/08/2020    Breast cancer screen  08/06/2020    Colon cancer screen colonoscopy  11/15/2026    Pneumococcal 0-64 years Vaccine  Completed    Hepatitis C screen  Completed    HIV screen  Completed

## 2019-04-18 NOTE — PROGRESS NOTES
[facility] SPECIALTY [facility]  [address]  [place], [address]  [phone]      Eber Barros is a 46 y.o. female who presents today for her  medicalconditions/complaints as noted below. Eber Barros is c/o of Sinus Problem (head/nasal congestion,cough with some production,headache,symptoms x1 wk,using otc w/no relief)  . HPI:    Pt also just completed 10- weeks of PT for her left shoulder and still having the same pain. She is wondering about an MRI as her xray originally was abnormal.     Sinusitis   This is a new problem. The current episode started in the past 7 days. The problem has been gradually worsening since onset. There has been no fever. The pain is moderate. Associated symptoms include congestion, coughing, headaches, shortness of breath (mild), sinus pressure and swollen glands. Pertinent negatives include no chills, diaphoresis, ear pain, hoarse voice, sneezing or sore throat. Past treatments include acetaminophen, saline sprays and oral decongestants (mucineix and allergy meds). The treatment provided no relief.        Past Medical History:   Diagnosis Date    Chronic pain of both shoulders 7/18/2018    Hyperlipidemia     Left sided sciatica 7/18/2018    Neuropathy     Type 2 diabetes mellitus (HCC)       Past Surgical History:   Procedure Laterality Date    HYSTERECTOMY      complete     Family History   Problem Relation Age of Onset    No Known Problems Mother     Diabetes Father     Cancer Father         prostate, skin, rectal    Diabetes Brother     Diabetes Paternal Aunt      Social History     Tobacco Use    Smoking status: Former Smoker    Smokeless tobacco: Never Used   Substance Use Topics    Alcohol use: No     Alcohol/week: 0.0 oz      Current Outpatient Medications   Medication Sig Dispense Refill    amoxicillin (AMOXIL) 875 MG tablet Take 1 tablet by mouth 2 times daily for 10 days 20 tablet 0    glimepiride (AMARYL) 2 MG tablet TAKE 1 TABLET BY MOUTH EVERY MORNING 30 tablet 0    SITagliptin (JANUVIA) 100 MG tablet Take 1 tablet by mouth daily 90 tablet 0    lisinopril (ZESTRIL) 2.5 MG tablet Take 1 tablet by mouth daily 30 tablet 5    meloxicam (MOBIC) 15 MG tablet Take 1 tablet by mouth daily 30 tablet 5    metFORMIN (GLUCOPHAGE) 1000 MG tablet Take 1 tablet by mouth 2 times daily (with meals) 60 tablet 5    atorvastatin (LIPITOR) 20 MG tablet Take 1 tablet by mouth daily 30 tablet 5    ONETOUCH VERIO strip Inject 1 each into the skin daily 100 each 1    vitamin D (ERGOCALCIFEROL) 65349 UNITS CAPS capsule Take 1 capsule by mouth once a week for 8 doses 8 capsule 0     No current facility-administered medications for this visit. No Known Allergies    Health Maintenance   Topic Date Due    Cervical cancer screen  06/27/1985    Diabetic foot exam  04/16/2019    Hepatitis B Vaccine (1 of 3 - Risk 3-dose series) 04/18/2020 (Originally 6/27/1983)    Shingles Vaccine (2 of 3) 07/10/2020 (Originally 12/5/2017)    DTaP/Tdap/Td vaccine (1 - Tdap) 10/26/2020 (Originally 6/27/1983)    Flu vaccine (Season Ended) 09/01/2019    A1C test (Diabetic or Prediabetic)  01/21/2020    Diabetic microalbuminuria test  01/21/2020    Lipid screen  01/24/2020    Potassium monitoring  01/24/2020    Creatinine monitoring  01/24/2020    Diabetic retinal exam  03/08/2020    Breast cancer screen  08/06/2020    Colon cancer screen colonoscopy  11/15/2026    Pneumococcal 0-64 years Vaccine  Completed    Hepatitis C screen  Completed    HIV screen  Completed       Subjective:      Review of Systems   Constitutional: Positive for activity change, appetite change and fatigue. Negative for chills, diaphoresis, fever and unexpected weight change. HENT: Positive for congestion, postnasal drip, rhinorrhea, sinus pressure and sinus pain. Negative for ear discharge, ear pain, hearing loss, hoarse voice, sneezing, sore throat and trouble swallowing.     Eyes: Negative for visual disturbance. Respiratory: Positive for cough and shortness of breath (mild). Negative for chest tightness. Cardiovascular: Negative for chest pain, palpitations and leg swelling. Gastrointestinal: Negative for abdominal pain, nausea and vomiting. Musculoskeletal: Positive for arthralgias and myalgias. Skin: Negative for rash. Neurological: Positive for weakness (left shoulder) and headaches. Negative for dizziness, tremors and light-headedness. Hematological: Negative for adenopathy. Psychiatric/Behavioral: Negative for sleep disturbance. Objective:      Physical Exam   Constitutional: She is oriented to person, place, and time. She appears well-developed and well-nourished. No distress. /88 (Site: Right Upper Arm, Position: Sitting, Cuff Size: Medium Adult)   Pulse 107   Temp 98.8 °F (37.1 °C) (Tympanic)   Ht 5' 8\" (1.727 m)   Wt 195 lb (88.5 kg)   SpO2 93%   BMI 29.65 kg/m²      HENT:   Head: Normocephalic and atraumatic. Right Ear: Hearing, tympanic membrane, external ear and ear canal normal.   Left Ear: Hearing, tympanic membrane, external ear and ear canal normal.   Nose: Nose normal. Right sinus exhibits no maxillary sinus tenderness and no frontal sinus tenderness. Left sinus exhibits no maxillary sinus tenderness and no frontal sinus tenderness. Mouth/Throat: Uvula is midline, oropharynx is clear and moist and mucous membranes are normal. No oropharyngeal exudate. Neck: Normal range of motion. Neck supple. Cardiovascular: Normal rate, regular rhythm and normal heart sounds. Pulmonary/Chest: Effort normal and breath sounds normal. No respiratory distress. She has no wheezes. Abdominal: Soft. There is no tenderness. Lymphadenopathy:     She has no cervical adenopathy. Neurological: She is alert and oriented to person, place, and time. Skin: Skin is warm and dry. Capillary refill takes less than 2 seconds. No rash noted. She is not diaphoretic.

## 2019-04-24 ENCOUNTER — OFFICE VISIT (OUTPATIENT)
Dept: FAMILY MEDICINE CLINIC | Age: 54
End: 2019-04-24
Payer: COMMERCIAL

## 2019-04-24 VITALS
DIASTOLIC BLOOD PRESSURE: 78 MMHG | HEART RATE: 78 BPM | OXYGEN SATURATION: 98 % | BODY MASS INDEX: 29.55 KG/M2 | TEMPERATURE: 97.5 F | HEIGHT: 68 IN | SYSTOLIC BLOOD PRESSURE: 127 MMHG | WEIGHT: 195 LBS

## 2019-04-24 DIAGNOSIS — E11.65 UNCONTROLLED TYPE 2 DIABETES MELLITUS WITH HYPERGLYCEMIA (HCC): Primary | ICD-10-CM

## 2019-04-24 DIAGNOSIS — Z12.11 SCREEN FOR COLON CANCER: ICD-10-CM

## 2019-04-24 LAB — HBA1C MFR BLD: 7.5 %

## 2019-04-24 PROCEDURE — 99213 OFFICE O/P EST LOW 20 MIN: CPT | Performed by: NURSE PRACTITIONER

## 2019-04-24 PROCEDURE — 83036 HEMOGLOBIN GLYCOSYLATED A1C: CPT | Performed by: NURSE PRACTITIONER

## 2019-04-24 PROCEDURE — 3045F PR MOST RECENT HEMOGLOBIN A1C LEVEL 7.0-9.0%: CPT | Performed by: NURSE PRACTITIONER

## 2019-04-24 RX ORDER — GLIMEPIRIDE 4 MG/1
4 TABLET ORAL
Qty: 90 TABLET | Refills: 1 | Status: SHIPPED | OUTPATIENT
Start: 2019-04-24 | End: 2019-07-22 | Stop reason: SDUPTHER

## 2019-04-24 ASSESSMENT — ENCOUNTER SYMPTOMS
VOMITING: 0
CHEST TIGHTNESS: 0
NAUSEA: 0
ABDOMINAL PAIN: 0
SHORTNESS OF BREATH: 0
COUGH: 0

## 2019-04-24 NOTE — PROGRESS NOTES
[facility]  [address]  [place], [address]  [phone]      Franchesca Barakat is a 46 y.o. female who presents today for her  medicalconditions/complaints as noted below. Franchesca Barakat is c/o of Diabetes (still amox)  . HPI:    Diabetes   She presents for her follow-up diabetic visit. She has type 2 diabetes mellitus. Her disease course has been stable. There are no hypoglycemic associated symptoms. Pertinent negatives for hypoglycemia include no dizziness or headaches. There are no diabetic associated symptoms. Pertinent negatives for diabetes include no chest pain, no fatigue, no polydipsia, no polyphagia, no polyuria and no weakness. There are no hypoglycemic complications. There are no diabetic complications. Risk factors for coronary artery disease include sedentary lifestyle, post-menopausal, dyslipidemia and family history. Current diabetic treatment includes diet and oral agent (triple therapy). She is compliant with treatment most of the time (she forgets 2nd dose of metformin oten). Her weight is stable. She is following a generally healthy diet. When asked about meal planning, she reported none. She has not had a previous visit with a dietitian. She participates in exercise intermittently. An ACE inhibitor/angiotensin II receptor blocker is being taken.  She does not see a podiatrist.      Past Medical History:   Diagnosis Date    Chronic pain of both shoulders 7/18/2018    Hyperlipidemia     Left sided sciatica 7/18/2018    Neuropathy     Type 2 diabetes mellitus (HCC)       Past Surgical History:   Procedure Laterality Date    HYSTERECTOMY      complete     Family History   Problem Relation Age of Onset    No Known Problems Mother     Diabetes Father     Cancer Father         prostate, skin, rectal    Diabetes Brother     Diabetes Paternal Aunt      Social History     Tobacco Use    Smoking status: Former Smoker    Smokeless tobacco: Never Used Respiratory: Negative for cough, chest tightness and shortness of breath. Cardiovascular: Negative for chest pain, palpitations and leg swelling. Gastrointestinal: Negative for abdominal pain, nausea and vomiting. Endocrine: Negative for polydipsia, polyphagia and polyuria. Skin: Negative for rash and wound. Neurological: Negative for dizziness, weakness, light-headedness, numbness and headaches. Hematological: Negative for adenopathy. Psychiatric/Behavioral: Negative for sleep disturbance. Objective:      Physical Exam   Constitutional: She is oriented to person, place, and time. She appears well-developed and well-nourished. No distress. HENT:   Head: Normocephalic and atraumatic. Neck: Neck supple. No JVD present. Cardiovascular: Normal rate, regular rhythm, normal heart sounds and intact distal pulses. No LE edema   Pulmonary/Chest: Effort normal and breath sounds normal.   Neurological: She is alert and oriented to person, place, and time. No sensory deficit. Coordination normal.   Skin: Skin is warm and dry. She is not diaphoretic. Diabetic foot check: Normal strength and range of motion of toes, feet, and ankles bilaterally. No joint deformity. No cyanosis or clubbing. 100% sensation at all 22 test points with the 10 gram filament. Dorsalis pedis pulses intact bilaterally. Capillary refill at the toes was less than 2 seconds. Light touch sensation intact bilaterally. Hair growth present on feet and toes bilaterally. No skin breakdown, erythema, rub spots, blisters, scaling, or ulcers. No calluses or corns. Toenails thin and not ingrown. No evidence of fungal infection. Psychiatric: She has a normal mood and affect. Her behavior is normal. Judgment and thought content normal.   Nursing note and vitals reviewed. Assessment:       Diagnosis Orders   1.  Uncontrolled type 2 diabetes mellitus with hyperglycemia (HCC)  POCT glycosylated hemoglobin (Hb A1C)    HM DIABETES FOOT EXAM   2. Screen for colon cancer  COLONOSCOPY W/ OR W/O BIOPSY     Results for orders placed or performed in visit on 04/24/19   POCT glycosylated hemoglobin (Hb A1C)   Result Value Ref Range    Hemoglobin A1C 7.5 %     Wt Readings from Last 3 Encounters:   04/24/19 195 lb (88.5 kg)   04/18/19 195 lb (88.5 kg)   01/24/19 190 lb (86.2 kg)     BP Readings from Last 3 Encounters:   04/24/19 127/78   04/18/19 124/88   01/24/19 117/82     Lab Results   Component Value Date    CHOL 146 01/24/2019    CHOL 192 10/06/2017    CHOL 226 (H) 10/18/2016     Lab Results   Component Value Date    TRIG 106 01/24/2019    TRIG 140 10/06/2017    TRIG 146 10/18/2016     Lab Results   Component Value Date    HDL 37 01/24/2019    HDL 43 10/06/2017    HDL 38 (L) 10/18/2016     Lab Results   Component Value Date    LDLCHOLESTEROL 121 10/06/2017    LDLCHOLESTEROL 159 (H) 10/18/2016    LDLCALC 88 01/24/2019     Lab Results   Component Value Date    VLDL 21 01/24/2019    VLDL NOT REPORTED 10/06/2017    VLDL NOT REPORTED 10/18/2016     Lab Results   Component Value Date    CHOLHDLRATIO 3.9 01/24/2019    CHOLHDLRATIO 4.5 10/06/2017    CHOLHDLRATIO 5.9 (H) 10/18/2016         Plan:      Return in about 3 months (around 7/24/2019) for return for diabetes management. Orders Placed This Encounter   Procedures    COLONOSCOPY W/ OR W/O BIOPSY     Standing Status:   Future     Standing Expiration Date:   4/24/2020     Order Specific Question:   Screening or Diagnostic? Answer:   Screening    POCT glycosylated hemoglobin (Hb A1C)    HM DIABETES FOOT EXAM     Orders Placed This Encounter   Medications    SITagliptin-metFORMIN HCl -1000 MG TB24     Sig: Take 1 tablet by mouth daily     Dispense:  90 tablet     Refill:  1    glimepiride (AMARYL) 4 MG tablet     Sig: Take 1 tablet by mouth every morning (before breakfast)     Dispense:  90 tablet     Refill:  1     Dm:   Stable for her but still uncontrolled  Change to janumet for elimination for second dose of metformin, increase amaryl to 4 mg  Follow diabetic diet and cut back on sweets  Check feet daily  Labs UTD    Health Maintenance reviewed - glycohemoglobin ordered, patient asked to schedule colonoscopy. Patient given educational materials - see patient instructions. Discussed use,benefit, and side effects of prescribed medications. All patient questions answered. Pt voiced understanding. Reviewed health maintenance. Instructed to continue currentmedications, diet and exercise.     Electronically signed by Jaycob Barber CNP on 4/24/2019 at 11:53 AM

## 2019-04-29 ENCOUNTER — HOSPITAL ENCOUNTER (OUTPATIENT)
Dept: MRI IMAGING | Facility: CLINIC | Age: 54
Discharge: HOME | End: 2019-05-01
Payer: COMMERCIAL

## 2019-04-29 DIAGNOSIS — M25.512 LEFT SHOULDER PAIN, UNSPECIFIED CHRONICITY: ICD-10-CM

## 2019-04-29 DIAGNOSIS — S46.019D: Primary | ICD-10-CM

## 2019-04-29 PROCEDURE — 73221 MRI JOINT UPR EXTREM W/O DYE: CPT

## 2019-05-03 ENCOUNTER — OFFICE VISIT (OUTPATIENT)
Dept: ORTHOPEDIC SURGERY | Age: 54
End: 2019-05-03
Payer: COMMERCIAL

## 2019-05-03 VITALS — BODY MASS INDEX: 28.73 KG/M2 | HEIGHT: 69 IN | WEIGHT: 194 LBS

## 2019-05-03 DIAGNOSIS — M75.122 COMPLETE TEAR OF LEFT ROTATOR CUFF: Primary | ICD-10-CM

## 2019-05-03 PROCEDURE — 99213 OFFICE O/P EST LOW 20 MIN: CPT | Performed by: ORTHOPAEDIC SURGERY

## 2019-05-03 NOTE — LETTER
5/3/2019    Katherin Camejo, APRN - CNP  [address] [place] [address]    RE: Juan Artis    Dear Dr. Sameer Pearson,    Thank you for allowing me to participate in the care of Ms. Madina Millan. I had the opportunity to evaluate the patient on 5/3/2019. Attached you will find my evaluation and recommendations. Thanks again for the confidence you have expressed in me by allowing my participation in the care of your patient. I will keep you apprised of further developments in the patients treatment course as it progresses. If I can be of further assistance in any fashion, please feel free to contact me at your convenience.     Sincerely,        Nory Apple  Shoulder and Elbow Surgery

## 2019-05-13 ENCOUNTER — HOSPITAL ENCOUNTER (OUTPATIENT)
Dept: PREADMISSION TESTING | Age: 54
Discharge: HOME | End: 2019-05-17
Payer: COMMERCIAL

## 2019-05-13 VITALS
TEMPERATURE: 98.2 F | BODY MASS INDEX: 28.73 KG/M2 | HEART RATE: 91 BPM | RESPIRATION RATE: 20 BRPM | OXYGEN SATURATION: 98 % | SYSTOLIC BLOOD PRESSURE: 120 MMHG | DIASTOLIC BLOOD PRESSURE: 79 MMHG | WEIGHT: 194 LBS | HEIGHT: 69 IN

## 2019-05-13 LAB
ABSOLUTE EOS #: 0.2 K/UL (ref 0–0.4)
ABSOLUTE IMMATURE GRANULOCYTE: NORMAL K/UL (ref 0–0.3)
ABSOLUTE LYMPH #: 3.2 K/UL (ref 1–4.8)
ABSOLUTE MONO #: 0.5 K/UL (ref 0.1–1.3)
ANION GAP SERPL CALCULATED.3IONS-SCNC: 13 MMOL/L (ref 9–17)
BASOPHILS # BLD: 1 % (ref 0–2)
BASOPHILS ABSOLUTE: 0 K/UL (ref 0–0.2)
BUN BLDV-MCNC: 11 MG/DL (ref 6–20)
BUN/CREAT BLD: ABNORMAL (ref 9–20)
CALCIUM SERPL-MCNC: 9.4 MG/DL (ref 8.6–10.4)
CHLORIDE BLD-SCNC: 102 MMOL/L (ref 98–107)
CO2: 28 MMOL/L (ref 20–31)
CREAT SERPL-MCNC: 0.52 MG/DL (ref 0.5–0.9)
DIFFERENTIAL TYPE: NORMAL
EOSINOPHILS RELATIVE PERCENT: 3 % (ref 0–4)
GFR AFRICAN AMERICAN: >60 ML/MIN
GFR NON-AFRICAN AMERICAN: >60 ML/MIN
GFR SERPL CREATININE-BSD FRML MDRD: ABNORMAL ML/MIN/{1.73_M2}
GFR SERPL CREATININE-BSD FRML MDRD: ABNORMAL ML/MIN/{1.73_M2}
GLUCOSE BLD-MCNC: 179 MG/DL (ref 70–99)
HCT VFR BLD CALC: 41.3 % (ref 36–46)
HEMOGLOBIN: 13.8 G/DL (ref 12–16)
IMMATURE GRANULOCYTES: NORMAL %
LYMPHOCYTES # BLD: 44 % (ref 24–44)
MCH RBC QN AUTO: 28.1 PG (ref 26–34)
MCHC RBC AUTO-ENTMCNC: 33.3 G/DL (ref 31–37)
MCV RBC AUTO: 84.3 FL (ref 80–100)
MONOCYTES # BLD: 7 % (ref 1–7)
NRBC AUTOMATED: NORMAL PER 100 WBC
PDW BLD-RTO: 14.7 % (ref 11.5–14.9)
PLATELET # BLD: 265 K/UL (ref 150–450)
PLATELET ESTIMATE: NORMAL
PMV BLD AUTO: 8.4 FL (ref 6–12)
POTASSIUM SERPL-SCNC: 4.3 MMOL/L (ref 3.7–5.3)
RBC # BLD: 4.9 M/UL (ref 4–5.2)
RBC # BLD: NORMAL 10*6/UL
SEG NEUTROPHILS: 45 % (ref 36–66)
SEGMENTED NEUTROPHILS ABSOLUTE COUNT: 3.4 K/UL (ref 1.3–9.1)
SODIUM BLD-SCNC: 143 MMOL/L (ref 135–144)
WBC # BLD: 7.4 K/UL (ref 3.5–11)
WBC # BLD: NORMAL 10*3/UL

## 2019-05-13 PROCEDURE — 80048 BASIC METABOLIC PNL TOTAL CA: CPT

## 2019-05-13 PROCEDURE — 36415 COLL VENOUS BLD VENIPUNCTURE: CPT

## 2019-05-13 PROCEDURE — 93005 ELECTROCARDIOGRAM TRACING: CPT

## 2019-05-13 PROCEDURE — 85025 COMPLETE CBC W/AUTO DIFF WBC: CPT

## 2019-05-13 NOTE — PROGRESS NOTES
Orthopedic Shoulder Encounter Note     Chief complaint: Left shoulder pain    HPI: Iraida Sol is a 46 y.o.  right-hand dominant female who presents for evaluation of her left shoulder. She indicates that she's been having pain in this shoulder for the past 10 weeks. Was seen by Dr. Karey Finney when following what she describes as \"minor injuries\" she developed left shoulder pain. On physical therapy without improvement. Subsequently she had an MRI study and referred to my clinic for further treatment. She states that her pain is primarily localized to the lateral aspect of the shoulder. In the biceps. He has increased pain with certain movements but denies having any weakness. She does report some stiffness. Previous treatment:    NSAIDs: None    Physical Therapy:  Yes    Injections: None    Surgeries: None    Review of Systems:     Constitution: no fever or chills   Pain level: 1/10  Musculoskeletal: As noted in the HPI   Neurologic: no neurologic symptoms    Past Medical History  Marquita  has a past medical history of Cancer ([facility] [address] Chronic pain of both shoulders, Hyperlipidemia, Left sided sciatica, Neuropathy, Type 2 diabetes mellitus ([facility] [address] and Wears glasses. Past Surgical History  Marquita  has a past surgical history that includes Hysterectomy; Dilation and curettage of uterus; Colonoscopy; and Colposcopy.     Current Medications  Current Outpatient Medications   Medication Sig Dispense Refill    metFORMIN (GLUCOPHAGE) 1000 MG tablet Take 1,000 mg by mouth 2 times daily (with meals)      SITagliptin (JANUVIA) 100 MG tablet Take 100 mg by mouth daily      SITagliptin-metFORMIN HCl -1000 MG TB24 Take 1 tablet by mouth daily 90 tablet 1    glimepiride (AMARYL) 4 MG tablet Take 1 tablet by mouth every morning (before breakfast) 90 tablet 1    lisinopril (ZESTRIL) 2.5 MG tablet Take 1 tablet by mouth daily 30 tablet 5    meloxicam (MOBIC) 15 MG tablet Take 1 tablet by mouth daily 30 tablet 5    atorvastatin (LIPITOR) 20 MG tablet Take 1 tablet by mouth daily 30 tablet 5    ONETOUCH VERIO strip Inject 1 each into the skin daily 100 each 1    vitamin D (ERGOCALCIFEROL) 39911 UNITS CAPS capsule Take 1 capsule by mouth once a week for 8 doses 8 capsule 0     No current facility-administered medications for this visit. Allergies  Allergies have been reviewed. Jhonatan Wheatley has No Known Allergies. Social History  Jhonatan Wheatley  reports that she has quit smoking. She has never used smokeless tobacco. She reports that she drinks alcohol. She reports that she does not use drugs. Family History  Grace's family history includes Cancer in her father; Diabetes in her brother, father, and paternal aunt; No Known Problems in her mother.      Physical Exam:     Ht 5' 8.75\" (1.746 m)   Wt 194 lb (88 kg)   BMI 28.86 kg/m²    General Appearance: alert, well appearing, and in no distress  Mental Status: alert, oriented to person, place, and time  Gait: normal    Shoulder:    Skin: warm and dry, no rash or erythema; no swelling or obvious muscular atrophy  Vasculature: 2+ radial pulses bilaterally  Neuro: Sensation grossly intact to light touch diffusely  Tenderness: Tender to palpation over the anterior aspect of the left shoulder    ROM: (Degrees)    Right   A P   Left   A P    Elevation  150    Elevation  140 140  Abduction  160    Abduction  155  165  ER   80    ER   65 80  IR   T12    IR   L1   90 abd/ER      90 abd/ER     90 abd/IR      90 abd/IR     Crepitation  No    Crepitation No  Dyskenesia  No    Dyskenesia No      Muscle strength:    Right       Left    Deltoid   5    Deltoid   5  Supraspinatus  5    Supraspinatus  4+  ER   5    ER   5  IR   5    IR   5    Special tests    Right   Rotator Cuff    Left    n   Painful arc    y   n   Pain with ER    n    n   Neer's     y    n   Hawkin's    y    n   Drop Arm    n  n   Lift off/Belly Press   n  n   ER Lag    n          [facility] [place] [facility]  n   [facility] [place]

## 2019-05-13 NOTE — H&P
HISTORY and [id number]       NAME:  Rosellen Bosworth  MRN: [id number]   YOB: 1965   Date: 5/13/2019   Age: 46 y.o. Gender: female       COMPLAINT AND PRESENT HISTORY:     Rosellen Bosworth is 46 y.o.,  female, undergoing preadmission testing for Left Shoulder Arthroscopy w/Rotator Cuff Repair. Patient is unable to recall specific injury to the shoulder. Pt states she works at [employer] and assumes that it could be related to home projects or wear and tear. Pt is right handed. No recent falls or trauma. No redness, swelling or rashes. Pt C/O of pain, weakness, stiffness, popping/ cracking and limitation in the range of motion (Abduction to 90 Degrees). Pt reports the pain as soreness, aching 2/10 in severity. She will take ibuprofen \"once in a while,\" if needed. Pt denies any other symptoms. No Hx of MRSA infections in the past.    Narrative   EXAMINATION:   MRI OF THE LEFT SHOULDER WITHOUT CONTRAST   4/29/2019 9:37 am       TECHNIQUE:   Multiplanar multisequence MRI of the left shoulder was performed without the   administration of intravenous contrast.       COMPARISON:   None.       HISTORY:   ORDERING SYSTEM PROVIDED HISTORY: Left shoulder pain, unspecified chronicity   TECHNOLOGIST PROVIDED HISTORY:   left shoulder pain x 3-4 months, abnormal xray   Ordering Physician Provided Reason for Exam:  Pt c/o lt shoulder pain,   shoulder popped 10 wks ago, failed P.T. Acuity: Chronic   Type of Exam: Ongoing       FINDINGS:   ROTATOR CUFF: Full-thickness, complete tear of the supraspinatus tendon   spanning 2.1 cm AP dimension with approximately 1.3 cm of retraction.    Infraspinatus tendinopathy and subtle intrasubstance tearing.  Subscapularis   and teres minor tendons appear intact.       BICEPS TENDON: The biceps tendon is ruptured with retraction into the upper   arm.       LABRUM: No discrete labral tear identified.  Articular cartilage is preserved   within the glenohumeral joint.       GLENOHUMERAL JOINT: Articular cartilage appears grossly preserved.  Small   glenohumeral joint effusion.  Joint fluid is noted to extend into the   subacromial/subdeltoid bursa.       AC JOINT AND ACROMIOCLAVICULAR ARCH: Mild arthrosis.  Acromion is flat in the   sagittal plane.       BONE MARROW: No discrete marrow signal abnormality.       OUTLET SPACES: The suprascapular notch and quadrilateral space are without   obstructing or space occupying lesions.           Impression   Full-thickness complete tear of the supraspinatus tendon spanning 2.1 x 1.3   cm.  Infraspinatus tendinopathy and intrasubstance tearing.       Intra-articular biceps tendon is absent, most compatible with rupture and   retraction.       Mild AC joint arthrosis     PAST MEDICAL HISTORY     Past Medical History:   Diagnosis Date    Cancer [facility])     pre cancer cervix    Chronic pain of both shoulders 7/18/2018    Hyperlipidemia     Left sided sciatica 7/18/2018    Neuropathy     Type 2 diabetes mellitus ([address]     Wears glasses      SURGICAL HISTORY       Past Surgical History:   Procedure Laterality Date    COLONOSCOPY      polyps removed x 3    COLPOSCOPY      DILATION AND CURETTAGE OF UTERUS      HYSTERECTOMY      complete       FAMILY HISTORY       Family History   Problem Relation Age of Onset    No Known Problems Mother     Diabetes Father     Cancer Father         prostate, skin, rectal    Diabetes Brother     Diabetes Paternal Aunt        SOCIAL HISTORY       Social History     Socioeconomic History    Marital status: Single     Spouse name: None    Number of children: None    Years of education: None    Highest education level: None   Occupational History    None   Social Needs    Financial resource strain: None    Food insecurity:     Worry: None     Inability: None    Transportation needs:     Medical: None     Non-medical: None   Tobacco Use    Smoking status: Former Smoker    Able to reach behind with left arm. Forward flexion to 120 degrees with pain. Symmetrical, trace pedal edema. No calf tenderness. NEUROLOGIC:  The patient is conscious, alert, oriented, No apparent focal sensory or motor deficits.                                                                                    PROVISIONAL DIAGNOSES / SURGERY:      Left Shoulder Rotator Cuff Tear  Left Shoulder Arthroscopy w/Rotator Cuff Repair     LORNA Hardy - CNP on 5/13/2019 at 1:37 PM

## 2019-05-14 ENCOUNTER — ANESTHESIA EVENT (OUTPATIENT)
Dept: OPERATING ROOM | Age: 54
End: 2019-05-14
Payer: COMMERCIAL

## 2019-05-14 DIAGNOSIS — R94.31 ABNORMAL EKG: ICD-10-CM

## 2019-05-14 DIAGNOSIS — Z01.818 PRE-OP TESTING: Primary | ICD-10-CM

## 2019-05-14 RX ORDER — SODIUM CHLORIDE 9 MG/ML
INJECTION, SOLUTION INTRAVENOUS CONTINUOUS
Status: CANCELLED | OUTPATIENT
Start: 2019-05-15

## 2019-05-14 RX ORDER — SODIUM CHLORIDE 0.9 % (FLUSH) 0.9 %
10 SYRINGE (ML) INJECTION EVERY 12 HOURS SCHEDULED
Status: CANCELLED | OUTPATIENT
Start: 2019-05-15

## 2019-05-14 RX ORDER — LIDOCAINE HYDROCHLORIDE 10 MG/ML
1 INJECTION, SOLUTION EPIDURAL; INFILTRATION; INTRACAUDAL; PERINEURAL
Status: CANCELLED | OUTPATIENT
Start: 2019-05-14 | End: 2019-05-14

## 2019-05-14 RX ORDER — SODIUM CHLORIDE 0.9 % (FLUSH) 0.9 %
10 SYRINGE (ML) INJECTION PRN
Status: CANCELLED | OUTPATIENT
Start: 2019-05-14

## 2019-05-16 ENCOUNTER — TELEPHONE (OUTPATIENT)
Dept: FAMILY MEDICINE CLINIC | Age: 54
End: 2019-05-16

## 2019-05-16 DIAGNOSIS — Z01.818 PRE-OP TESTING: Primary | ICD-10-CM

## 2019-05-16 DIAGNOSIS — R94.31 ABNORMAL EKG: ICD-10-CM

## 2019-05-16 NOTE — TELEPHONE ENCOUNTER
Patient called she states she had a stress test about 20 years ago, ordered by dr Hai Lao, she was told she had an irregular heart beat and to just live her life with it, no need for medication.  Pt would like to know if you wanted to view that report before she schedules this stress test? Please advise thank you

## 2019-05-20 ENCOUNTER — OFFICE VISIT (OUTPATIENT)
Dept: ORTHOPEDIC SURGERY | Age: 54
End: 2019-05-20

## 2019-05-20 VITALS — WEIGHT: 194 LBS | HEIGHT: 69 IN | BODY MASS INDEX: 28.73 KG/M2

## 2019-05-20 DIAGNOSIS — M75.122 COMPLETE TEAR OF LEFT ROTATOR CUFF: Primary | ICD-10-CM

## 2019-05-20 PROCEDURE — 99024 POSTOP FOLLOW-UP VISIT: CPT | Performed by: ORTHOPAEDIC SURGERY

## 2019-05-20 RX ORDER — GABAPENTIN 300 MG/1
300 CAPSULE ORAL NIGHTLY
Qty: 7 CAPSULE | Refills: 0 | Status: ON HOLD | OUTPATIENT
Start: 2019-05-20 | End: 2019-05-22 | Stop reason: ALTCHOICE

## 2019-05-20 RX ORDER — HYDROCODONE BITARTRATE AND ACETAMINOPHEN 5; 325 MG/1; MG/1
1 TABLET ORAL EVERY 4 HOURS PRN
Qty: 42 TABLET | Refills: 0 | Status: SHIPPED | OUTPATIENT
Start: 2019-05-20 | End: 2019-05-27

## 2019-05-20 RX ORDER — ONDANSETRON 4 MG/1
4 TABLET, FILM COATED ORAL EVERY 12 HOURS PRN
Qty: 20 TABLET | Refills: 0 | Status: SHIPPED | OUTPATIENT
Start: 2019-05-20 | End: 2020-12-17

## 2019-05-20 NOTE — TELEPHONE ENCOUNTER
Pt states her surgery will be postponed until she can get stress test. I called [facility] scheduling and they state if the order is STAT they can get her in within 24 hours but if not due to insurance it will take a few weeks to get her in. Her surgery is scheduled for 5/22/19 in the morning.   Please advise

## 2019-05-21 ENCOUNTER — HOSPITAL ENCOUNTER (OUTPATIENT)
Dept: NON INVASIVE DIAGNOSTICS | Age: 54
Discharge: HOME | End: 2019-05-21
Payer: COMMERCIAL

## 2019-05-21 DIAGNOSIS — R94.31 ABNORMAL EKG: ICD-10-CM

## 2019-05-21 DIAGNOSIS — Z01.818 PRE-OP TESTING: ICD-10-CM

## 2019-05-21 PROCEDURE — 93017 CV STRESS TEST TRACING ONLY: CPT | Performed by: NURSE PRACTITIONER

## 2019-05-21 PROCEDURE — 93350 STRESS TTE ONLY: CPT | Performed by: NURSE PRACTITIONER

## 2019-05-21 NOTE — PROCEDURES
[address] [facility]                              CARDIAC STRESS TEST    PATIENT NAME: Marline Sierra                      :        1965  MED REC NO:   [id number]                             ROOM:  ACCOUNT NO:   [de-identified]                           ADMIT DATE: 2019  PROVIDER:     Ridge Gutierrez    DATE OF STUDY:  2019    ORDERING PROVIDER:  PHILLIP Reece  PRIMARY CARE PROVIDER:  PHILLIP Reece  INTERPRETING PHYSICIAN:  Ridge Gutierrez MD    STRESS ECHO STUDY:    Resting heart rate:  70  Resting blood pressure:  121/78  Maximum heart rate:  164, or 98% of age predicted maximum  Peak blood pressure:  134/79  Protocol:  Ismael  METS:  10.5  Duration:  6 minutes and 17 seconds  Reason for termination:  Reached target heart rate  Resting EKG:  Normal, sinus rhythm  Stress heart response:  Normal response  Stress BP response:  Appropriate  Stress EKG's:  Normal  Chest discomfort:  None  Ischemic EKG changes:  None    IMPRESSION:  Electrocardiographically negative stress study. ECHO INTERPRETATION:  Normal at rest with no exercise induced ischemia.   Pre-exercise LVEF:   55%  Post exercise LVEF:  70%    FINAL IMPRESSION:  Normal treadmill EKG and Echocardiogram.        THALIA MALDONADO    D: 2019 13:06:29       T: 2019 13:07:32     AS/RASTA

## 2019-05-22 ENCOUNTER — ANESTHESIA (OUTPATIENT)
Dept: OPERATING ROOM | Age: 54
End: 2019-05-22
Payer: COMMERCIAL

## 2019-05-22 ENCOUNTER — HOSPITAL ENCOUNTER (OUTPATIENT)
Age: 54
Setting detail: OUTPATIENT SURGERY
Discharge: HOME | End: 2019-05-22
Attending: ORTHOPAEDIC SURGERY | Admitting: ORTHOPAEDIC SURGERY
Payer: COMMERCIAL

## 2019-05-22 VITALS
BODY MASS INDEX: 28.73 KG/M2 | TEMPERATURE: 97.2 F | RESPIRATION RATE: 16 BRPM | OXYGEN SATURATION: 94 % | HEIGHT: 69 IN | SYSTOLIC BLOOD PRESSURE: 97 MMHG | WEIGHT: 194 LBS | HEART RATE: 73 BPM | DIASTOLIC BLOOD PRESSURE: 57 MMHG

## 2019-05-22 VITALS — TEMPERATURE: 96.3 F | OXYGEN SATURATION: 95 % | DIASTOLIC BLOOD PRESSURE: 68 MMHG | SYSTOLIC BLOOD PRESSURE: 119 MMHG

## 2019-05-22 LAB
GLUCOSE BLD-MCNC: 160 MG/DL (ref 65–105)
GLUCOSE BLD-MCNC: 217 MG/DL (ref 65–105)

## 2019-05-22 PROCEDURE — 2709999900 HC NON-CHARGEABLE SUPPLY: Performed by: ORTHOPAEDIC SURGERY

## 2019-05-22 PROCEDURE — 3700000001 HC ADD 15 MINUTES (ANESTHESIA): Performed by: ORTHOPAEDIC SURGERY

## 2019-05-22 PROCEDURE — 7100000010 HC PHASE II RECOVERY - FIRST 15 MIN: Performed by: ORTHOPAEDIC SURGERY

## 2019-05-22 PROCEDURE — C1713 ANCHOR/SCREW BN/BN,TIS/BN: HCPCS | Performed by: ORTHOPAEDIC SURGERY

## 2019-05-22 PROCEDURE — 2580000003 HC RX 258: Performed by: NURSE ANESTHETIST, CERTIFIED REGISTERED

## 2019-05-22 PROCEDURE — 6370000000 HC RX 637 (ALT 250 FOR IP): Performed by: ORTHOPAEDIC SURGERY

## 2019-05-22 PROCEDURE — 3600000014 HC SURGERY LEVEL 4 ADDTL 15MIN: Performed by: ORTHOPAEDIC SURGERY

## 2019-05-22 PROCEDURE — 2500000003 HC RX 250 WO HCPCS: Performed by: NURSE ANESTHETIST, CERTIFIED REGISTERED

## 2019-05-22 PROCEDURE — 6370000000 HC RX 637 (ALT 250 FOR IP): Performed by: ANESTHESIOLOGY

## 2019-05-22 PROCEDURE — 7100000011 HC PHASE II RECOVERY - ADDTL 15 MIN: Performed by: ORTHOPAEDIC SURGERY

## 2019-05-22 PROCEDURE — 7100000001 HC PACU RECOVERY - ADDTL 15 MIN: Performed by: ORTHOPAEDIC SURGERY

## 2019-05-22 PROCEDURE — 6360000002 HC RX W HCPCS: Performed by: ANESTHESIOLOGY

## 2019-05-22 PROCEDURE — 82947 ASSAY GLUCOSE BLOOD QUANT: CPT

## 2019-05-22 PROCEDURE — 3700000000 HC ANESTHESIA ATTENDED CARE: Performed by: ORTHOPAEDIC SURGERY

## 2019-05-22 PROCEDURE — 6360000002 HC RX W HCPCS: Performed by: ORTHOPAEDIC SURGERY

## 2019-05-22 PROCEDURE — 6360000002 HC RX W HCPCS: Performed by: NURSE ANESTHETIST, CERTIFIED REGISTERED

## 2019-05-22 PROCEDURE — 29823 SHO ARTHRS SRG XTNSV DBRDMT: CPT | Performed by: ORTHOPAEDIC SURGERY

## 2019-05-22 PROCEDURE — 7100000031 HC ASPR PHASE II RECOVERY - ADDTL 15 MIN: Performed by: ORTHOPAEDIC SURGERY

## 2019-05-22 PROCEDURE — 29827 SHO ARTHRS SRG RT8TR CUF RPR: CPT | Performed by: ORTHOPAEDIC SURGERY

## 2019-05-22 PROCEDURE — 3600000004 HC SURGERY LEVEL 4 BASE: Performed by: ORTHOPAEDIC SURGERY

## 2019-05-22 PROCEDURE — 76942 ECHO GUIDE FOR BIOPSY: CPT | Performed by: ANESTHESIOLOGY

## 2019-05-22 PROCEDURE — 2720000010 HC SURG SUPPLY STERILE: Performed by: ORTHOPAEDIC SURGERY

## 2019-05-22 PROCEDURE — 7100000000 HC PACU RECOVERY - FIRST 15 MIN: Performed by: ORTHOPAEDIC SURGERY

## 2019-05-22 PROCEDURE — L3650 SO 8 ABD RESTRAINT PRE OTS: HCPCS | Performed by: ORTHOPAEDIC SURGERY

## 2019-05-22 PROCEDURE — 2580000003 HC RX 258: Performed by: ANESTHESIOLOGY

## 2019-05-22 PROCEDURE — 7100000030 HC ASPR PHASE II RECOVERY - FIRST 15 MIN: Performed by: ORTHOPAEDIC SURGERY

## 2019-05-22 DEVICE — ANCHOR SUT L24.5MM DIA4.75MM BIOCOMPOSITE SELF PUNCHING: Type: IMPLANTABLE DEVICE | Site: SHOULDER | Status: FUNCTIONAL

## 2019-05-22 DEVICE — ANCHOR SUT L14.7MM DIA5.5MM BIOCOMPOSITE FULL THRD W/ 1.3MM: Type: IMPLANTABLE DEVICE | Site: SHOULDER | Status: FUNCTIONAL

## 2019-05-22 RX ORDER — FENTANYL CITRATE 50 UG/ML
25 INJECTION, SOLUTION INTRAMUSCULAR; INTRAVENOUS EVERY 5 MIN PRN
Status: DISCONTINUED | OUTPATIENT
Start: 2019-05-22 | End: 2019-05-22 | Stop reason: HOSPADM

## 2019-05-22 RX ORDER — MEPERIDINE HYDROCHLORIDE 50 MG/ML
12.5 INJECTION INTRAMUSCULAR; INTRAVENOUS; SUBCUTANEOUS EVERY 5 MIN PRN
Status: DISCONTINUED | OUTPATIENT
Start: 2019-05-22 | End: 2019-05-22 | Stop reason: HOSPADM

## 2019-05-22 RX ORDER — DIPHENHYDRAMINE HYDROCHLORIDE 50 MG/ML
12.5 INJECTION INTRAMUSCULAR; INTRAVENOUS
Status: DISCONTINUED | OUTPATIENT
Start: 2019-05-22 | End: 2019-05-22 | Stop reason: HOSPADM

## 2019-05-22 RX ORDER — KETOROLAC TROMETHAMINE 30 MG/ML
30 INJECTION, SOLUTION INTRAMUSCULAR; INTRAVENOUS
Status: COMPLETED | OUTPATIENT
Start: 2019-05-22 | End: 2019-05-22

## 2019-05-22 RX ORDER — HYDRALAZINE HYDROCHLORIDE 20 MG/ML
5 INJECTION INTRAMUSCULAR; INTRAVENOUS EVERY 10 MIN PRN
Status: DISCONTINUED | OUTPATIENT
Start: 2019-05-22 | End: 2019-05-22 | Stop reason: HOSPADM

## 2019-05-22 RX ORDER — SODIUM CHLORIDE 9 MG/ML
INJECTION, SOLUTION INTRAVENOUS CONTINUOUS
Status: DISCONTINUED | OUTPATIENT
Start: 2019-05-22 | End: 2019-05-22 | Stop reason: HOSPADM

## 2019-05-22 RX ORDER — SODIUM CHLORIDE 0.9 % (FLUSH) 0.9 %
10 SYRINGE (ML) INJECTION EVERY 12 HOURS SCHEDULED
Status: DISCONTINUED | OUTPATIENT
Start: 2019-05-22 | End: 2019-05-22 | Stop reason: SDUPTHER

## 2019-05-22 RX ORDER — FENTANYL CITRATE 50 UG/ML
50 INJECTION, SOLUTION INTRAMUSCULAR; INTRAVENOUS EVERY 5 MIN PRN
Status: DISCONTINUED | OUTPATIENT
Start: 2019-05-22 | End: 2019-05-22 | Stop reason: HOSPADM

## 2019-05-22 RX ORDER — METOCLOPRAMIDE HYDROCHLORIDE 5 MG/ML
10 INJECTION INTRAMUSCULAR; INTRAVENOUS
Status: DISCONTINUED | OUTPATIENT
Start: 2019-05-22 | End: 2019-05-22 | Stop reason: HOSPADM

## 2019-05-22 RX ORDER — FENTANYL CITRATE 50 UG/ML
INJECTION, SOLUTION INTRAMUSCULAR; INTRAVENOUS PRN
Status: DISCONTINUED | OUTPATIENT
Start: 2019-05-22 | End: 2019-05-22 | Stop reason: SDUPTHER

## 2019-05-22 RX ORDER — SODIUM CHLORIDE 0.9 % (FLUSH) 0.9 %
10 SYRINGE (ML) INJECTION EVERY 12 HOURS SCHEDULED
Status: DISCONTINUED | OUTPATIENT
Start: 2019-05-22 | End: 2019-05-22 | Stop reason: HOSPADM

## 2019-05-22 RX ORDER — GLYCOPYRROLATE 1 MG/5 ML
SYRINGE (ML) INTRAVENOUS PRN
Status: DISCONTINUED | OUTPATIENT
Start: 2019-05-22 | End: 2019-05-22 | Stop reason: SDUPTHER

## 2019-05-22 RX ORDER — LIDOCAINE HYDROCHLORIDE 10 MG/ML
1 INJECTION, SOLUTION EPIDURAL; INFILTRATION; INTRACAUDAL; PERINEURAL
Status: DISCONTINUED | OUTPATIENT
Start: 2019-05-22 | End: 2019-05-22 | Stop reason: HOSPADM

## 2019-05-22 RX ORDER — LIDOCAINE HYDROCHLORIDE 10 MG/ML
INJECTION, SOLUTION EPIDURAL; INFILTRATION; INTRACAUDAL; PERINEURAL PRN
Status: DISCONTINUED | OUTPATIENT
Start: 2019-05-22 | End: 2019-05-22 | Stop reason: SDUPTHER

## 2019-05-22 RX ORDER — SODIUM CHLORIDE 0.9 % (FLUSH) 0.9 %
10 SYRINGE (ML) INJECTION PRN
Status: DISCONTINUED | OUTPATIENT
Start: 2019-05-22 | End: 2019-05-22 | Stop reason: HOSPADM

## 2019-05-22 RX ORDER — ACETAMINOPHEN 500 MG
1000 TABLET ORAL ONCE
Status: COMPLETED | OUTPATIENT
Start: 2019-05-22 | End: 2019-05-22

## 2019-05-22 RX ORDER — ONDANSETRON 2 MG/ML
INJECTION INTRAMUSCULAR; INTRAVENOUS PRN
Status: DISCONTINUED | OUTPATIENT
Start: 2019-05-22 | End: 2019-05-22 | Stop reason: SDUPTHER

## 2019-05-22 RX ORDER — PROPOFOL 10 MG/ML
INJECTION, EMULSION INTRAVENOUS PRN
Status: DISCONTINUED | OUTPATIENT
Start: 2019-05-22 | End: 2019-05-22 | Stop reason: SDUPTHER

## 2019-05-22 RX ORDER — METOCLOPRAMIDE HYDROCHLORIDE 5 MG/ML
INJECTION INTRAMUSCULAR; INTRAVENOUS PRN
Status: DISCONTINUED | OUTPATIENT
Start: 2019-05-22 | End: 2019-05-22

## 2019-05-22 RX ORDER — ROCURONIUM BROMIDE 10 MG/ML
INJECTION, SOLUTION INTRAVENOUS PRN
Status: DISCONTINUED | OUTPATIENT
Start: 2019-05-22 | End: 2019-05-22 | Stop reason: SDUPTHER

## 2019-05-22 RX ORDER — SODIUM CHLORIDE 0.9 % (FLUSH) 0.9 %
10 SYRINGE (ML) INJECTION PRN
Status: DISCONTINUED | OUTPATIENT
Start: 2019-05-22 | End: 2019-05-22 | Stop reason: SDUPTHER

## 2019-05-22 RX ORDER — METOCLOPRAMIDE HYDROCHLORIDE 5 MG/ML
INJECTION INTRAMUSCULAR; INTRAVENOUS PRN
Status: DISCONTINUED | OUTPATIENT
Start: 2019-05-22 | End: 2019-05-22 | Stop reason: SDUPTHER

## 2019-05-22 RX ORDER — NEOSTIGMINE METHYLSULFATE 5 MG/5 ML
SYRINGE (ML) INTRAVENOUS PRN
Status: DISCONTINUED | OUTPATIENT
Start: 2019-05-22 | End: 2019-05-22 | Stop reason: SDUPTHER

## 2019-05-22 RX ORDER — PHENYLEPHRINE HYDROCHLORIDE 10 MG/ML
INJECTION INTRAVENOUS PRN
Status: DISCONTINUED | OUTPATIENT
Start: 2019-05-22 | End: 2019-05-22 | Stop reason: SDUPTHER

## 2019-05-22 RX ORDER — DEXAMETHASONE SODIUM PHOSPHATE 10 MG/ML
10 INJECTION, SOLUTION INTRAMUSCULAR; INTRAVENOUS ONCE
Status: COMPLETED | OUTPATIENT
Start: 2019-05-22 | End: 2019-05-22

## 2019-05-22 RX ORDER — ONDANSETRON 2 MG/ML
4 INJECTION INTRAMUSCULAR; INTRAVENOUS
Status: DISCONTINUED | OUTPATIENT
Start: 2019-05-22 | End: 2019-05-22 | Stop reason: HOSPADM

## 2019-05-22 RX ORDER — HYDROCODONE BITARTRATE AND ACETAMINOPHEN 5; 325 MG/1; MG/1
1 TABLET ORAL PRN
Status: COMPLETED | OUTPATIENT
Start: 2019-05-22 | End: 2019-05-22

## 2019-05-22 RX ORDER — LABETALOL 20 MG/4 ML (5 MG/ML) INTRAVENOUS SYRINGE
5 EVERY 10 MIN PRN
Status: DISCONTINUED | OUTPATIENT
Start: 2019-05-22 | End: 2019-05-22 | Stop reason: HOSPADM

## 2019-05-22 RX ORDER — MORPHINE SULFATE 2 MG/ML
2 INJECTION, SOLUTION INTRAMUSCULAR; INTRAVENOUS EVERY 5 MIN PRN
Status: DISCONTINUED | OUTPATIENT
Start: 2019-05-22 | End: 2019-05-22 | Stop reason: HOSPADM

## 2019-05-22 RX ORDER — HYDROCODONE BITARTRATE AND ACETAMINOPHEN 5; 325 MG/1; MG/1
2 TABLET ORAL PRN
Status: COMPLETED | OUTPATIENT
Start: 2019-05-22 | End: 2019-05-22

## 2019-05-22 RX ORDER — MIDAZOLAM HYDROCHLORIDE 1 MG/ML
INJECTION INTRAMUSCULAR; INTRAVENOUS PRN
Status: DISCONTINUED | OUTPATIENT
Start: 2019-05-22 | End: 2019-05-22 | Stop reason: SDUPTHER

## 2019-05-22 RX ADMIN — KETOROLAC TROMETHAMINE 30 MG: 30 INJECTION, SOLUTION INTRAMUSCULAR; INTRAVENOUS at 11:03

## 2019-05-22 RX ADMIN — SODIUM CHLORIDE: 9 INJECTION, SOLUTION INTRAVENOUS at 06:24

## 2019-05-22 RX ADMIN — ONDANSETRON 4 MG: 2 INJECTION INTRAMUSCULAR; INTRAVENOUS at 09:11

## 2019-05-22 RX ADMIN — FENTANYL CITRATE 25 MCG: 50 INJECTION, SOLUTION INTRAMUSCULAR; INTRAVENOUS at 09:31

## 2019-05-22 RX ADMIN — HYDROCODONE BITARTRATE AND ACETAMINOPHEN 1 TABLET: 5; 325 TABLET ORAL at 11:34

## 2019-05-22 RX ADMIN — PHENYLEPHRINE HYDROCHLORIDE 80 MCG: 10 INJECTION INTRAVENOUS at 07:20

## 2019-05-22 RX ADMIN — HYDROMORPHONE HYDROCHLORIDE 0.5 MG: 1 INJECTION, SOLUTION INTRAMUSCULAR; INTRAVENOUS; SUBCUTANEOUS at 10:25

## 2019-05-22 RX ADMIN — PROPOFOL 150 MG: 10 INJECTION, EMULSION INTRAVENOUS at 07:06

## 2019-05-22 RX ADMIN — METOCLOPRAMIDE 10 MG: 5 INJECTION, SOLUTION INTRAMUSCULAR; INTRAVENOUS at 07:19

## 2019-05-22 RX ADMIN — LIDOCAINE HYDROCHLORIDE 30 MG: 10 INJECTION, SOLUTION EPIDURAL; INFILTRATION; INTRACAUDAL; PERINEURAL at 09:10

## 2019-05-22 RX ADMIN — PHENYLEPHRINE HYDROCHLORIDE 40 MCG/MIN: 10 INJECTION INTRAVENOUS at 07:32

## 2019-05-22 RX ADMIN — MIDAZOLAM 2 MG: 1 INJECTION INTRAMUSCULAR; INTRAVENOUS at 06:40

## 2019-05-22 RX ADMIN — SODIUM CHLORIDE: 9 INJECTION, SOLUTION INTRAVENOUS at 08:32

## 2019-05-22 RX ADMIN — Medication 0.5 MG: at 09:13

## 2019-05-22 RX ADMIN — ROCURONIUM BROMIDE 45 MG: 10 INJECTION INTRAVENOUS at 07:07

## 2019-05-22 RX ADMIN — Medication 2.5 MG: at 09:15

## 2019-05-22 RX ADMIN — LIDOCAINE HYDROCHLORIDE 50 MG: 10 INJECTION, SOLUTION EPIDURAL; INFILTRATION; INTRACAUDAL; PERINEURAL at 07:06

## 2019-05-22 RX ADMIN — ACETAMINOPHEN 1000 MG: 500 TABLET, FILM COATED ORAL at 06:27

## 2019-05-22 RX ADMIN — ROCURONIUM BROMIDE 5 MG: 10 INJECTION INTRAVENOUS at 07:06

## 2019-05-22 RX ADMIN — PROPOFOL 50 MG: 10 INJECTION, EMULSION INTRAVENOUS at 08:25

## 2019-05-22 RX ADMIN — Medication 2 G: at 07:10

## 2019-05-22 RX ADMIN — PHENYLEPHRINE HYDROCHLORIDE 80 MCG: 10 INJECTION INTRAVENOUS at 07:24

## 2019-05-22 RX ADMIN — FENTANYL CITRATE 25 MCG: 50 INJECTION, SOLUTION INTRAMUSCULAR; INTRAVENOUS at 08:25

## 2019-05-22 RX ADMIN — DEXAMETHASONE SODIUM PHOSPHATE 10 MG: 10 INJECTION, SOLUTION INTRAMUSCULAR; INTRAVENOUS at 07:18

## 2019-05-22 RX ADMIN — FENTANYL CITRATE 50 MCG: 50 INJECTION, SOLUTION INTRAMUSCULAR; INTRAVENOUS at 06:40

## 2019-05-22 ASSESSMENT — PULMONARY FUNCTION TESTS
PIF_VALUE: 16
PIF_VALUE: 21
PIF_VALUE: 20
PIF_VALUE: 2
PIF_VALUE: 16
PIF_VALUE: 20
PIF_VALUE: 16
PIF_VALUE: 18
PIF_VALUE: 16
PIF_VALUE: 20
PIF_VALUE: 17
PIF_VALUE: 20
PIF_VALUE: 19
PIF_VALUE: 17
PIF_VALUE: 21
PIF_VALUE: 21
PIF_VALUE: 20
PIF_VALUE: 16
PIF_VALUE: 21
PIF_VALUE: 16
PIF_VALUE: 16
PIF_VALUE: 21
PIF_VALUE: 16
PIF_VALUE: 17
PIF_VALUE: 20
PIF_VALUE: 20
PIF_VALUE: 21
PIF_VALUE: 21
PIF_VALUE: 16
PIF_VALUE: 17
PIF_VALUE: 20
PIF_VALUE: 21
PIF_VALUE: 2
PIF_VALUE: 16
PIF_VALUE: 20
PIF_VALUE: 16
PIF_VALUE: 17
PIF_VALUE: 21
PIF_VALUE: 16
PIF_VALUE: 14
PIF_VALUE: 14
PIF_VALUE: 17
PIF_VALUE: 2
PIF_VALUE: 19
PIF_VALUE: 20
PIF_VALUE: 16
PIF_VALUE: 20
PIF_VALUE: 15
PIF_VALUE: 1
PIF_VALUE: 18
PIF_VALUE: 17
PIF_VALUE: 21
PIF_VALUE: 21
PIF_VALUE: 15
PIF_VALUE: 7
PIF_VALUE: 4
PIF_VALUE: 11
PIF_VALUE: 20
PIF_VALUE: 16
PIF_VALUE: 20
PIF_VALUE: 16
PIF_VALUE: 18
PIF_VALUE: 16
PIF_VALUE: 21
PIF_VALUE: 20
PIF_VALUE: 16
PIF_VALUE: 16
PIF_VALUE: 21
PIF_VALUE: 14
PIF_VALUE: 16
PIF_VALUE: 20
PIF_VALUE: 16
PIF_VALUE: 3
PIF_VALUE: 16
PIF_VALUE: 14
PIF_VALUE: 16
PIF_VALUE: 16
PIF_VALUE: 20
PIF_VALUE: 16
PIF_VALUE: 16
PIF_VALUE: 17
PIF_VALUE: 22
PIF_VALUE: 16
PIF_VALUE: 21
PIF_VALUE: 16
PIF_VALUE: 17
PIF_VALUE: 1
PIF_VALUE: 21
PIF_VALUE: 20
PIF_VALUE: 20
PIF_VALUE: 16
PIF_VALUE: 17
PIF_VALUE: 20
PIF_VALUE: 15
PIF_VALUE: 21
PIF_VALUE: 20
PIF_VALUE: 20
PIF_VALUE: 22
PIF_VALUE: 15
PIF_VALUE: 17
PIF_VALUE: 16
PIF_VALUE: 16
PIF_VALUE: 20
PIF_VALUE: 16
PIF_VALUE: 22
PIF_VALUE: 16
PIF_VALUE: 15
PIF_VALUE: 21
PIF_VALUE: 16
PIF_VALUE: 16
PIF_VALUE: 21
PIF_VALUE: 16
PIF_VALUE: 16
PIF_VALUE: 17
PIF_VALUE: 15
PIF_VALUE: 15
PIF_VALUE: 16
PIF_VALUE: 17
PIF_VALUE: 2
PIF_VALUE: 20
PIF_VALUE: 16
PIF_VALUE: 15
PIF_VALUE: 21
PIF_VALUE: 16
PIF_VALUE: 17
PIF_VALUE: 21
PIF_VALUE: 14
PIF_VALUE: 21
PIF_VALUE: 20
PIF_VALUE: 14
PIF_VALUE: 11

## 2019-05-22 ASSESSMENT — PAIN DESCRIPTION - PAIN TYPE
TYPE: SURGICAL PAIN

## 2019-05-22 ASSESSMENT — PAIN SCALES - GENERAL
PAINLEVEL_OUTOF10: 0
PAINLEVEL_OUTOF10: 6
PAINLEVEL_OUTOF10: 8
PAINLEVEL_OUTOF10: 6
PAINLEVEL_OUTOF10: 5
PAINLEVEL_OUTOF10: 6

## 2019-05-22 ASSESSMENT — PAIN DESCRIPTION - ORIENTATION
ORIENTATION: LEFT
ORIENTATION: LEFT

## 2019-05-22 ASSESSMENT — LIFESTYLE VARIABLES: SMOKING_STATUS: 0

## 2019-05-22 ASSESSMENT — PAIN DESCRIPTION - LOCATION
LOCATION: SHOULDER

## 2019-05-22 ASSESSMENT — ENCOUNTER SYMPTOMS
STRIDOR: 0
SHORTNESS OF BREATH: 0

## 2019-05-22 ASSESSMENT — PAIN - FUNCTIONAL ASSESSMENT: PAIN_FUNCTIONAL_ASSESSMENT: 0-10

## 2019-05-22 NOTE — BRIEF OP NOTE
Brief Postoperative Note  ______________________________________________________________    Patient: Edna Casillas  YOB: 1965  MRN: [id number]  Date of Procedure: 5/22/2019    Pre-Op Diagnosis: LEFT SHOULDER ROTATOR CUFF TEAR    Post-Op Diagnosis: Same       Procedure(s):  SHOULDER ARTHROSCOPY W/ROTATOR CUFF REPAIR    Anesthesia: Regional, General    Surgeon(s):  Brian Barrientos MD    Assistant: Travon Aponte DO (PGY 3)    Estimated Blood Loss (mL): less than 50     Complications: None    Specimens:   * No specimens in log *    Implants:  Implant Name Type Inv.  Item Serial No.  Lot No. LRB No. Used   CORKSCREW 5.5MM BC FT W/SUTURETAPE Screw/Plate/Nail/Tony CORKSCREW 5.5MM BC FT W/SUTURETAPE  [employer] [id number] Left 1   CORKSCREW 5.5MM BC FT W/SUTURETAPE Screw/Plate/Nail/Tony CORKSCREW 5.5MM BC FT W/SUTURETAPE  [employer] [id number] Left 1   ANCHOR SWIVELOCK BIO SLF PUNCHING 4.75X24.5MM Fastener ANCHOR SWIVELOCK BIO SLF PUNCHING 4.75X24.5MM  [employer] [id number] Left 1   ANCHOR SWIVELOCK BIO SLF PUNCHING 4.75X24.5MM Fastener ANCHOR SWIVELOCK BIO SLF PUNCHING 4.75X24.5MM  [employer] [id number] Left 1         Drains:   NG/OG/NJ/NE Tube (Active)       Findings: See operative report    Brian Barrientos MD  Date: 5/22/2019  Time: 9:26 AM

## 2019-05-22 NOTE — ANESTHESIA POSTPROCEDURE EVALUATION
Department of Anesthesiology  Postprocedure Note    Patient: Britta Plasencia  MRN: [id number]  YOB: 1965  Date of evaluation: 5/22/2019  Time:  11:49 AM     Procedure Summary     Date:  05/22/19 Room / Location:  [address] [place] [address] Jensen Morgan 03 / [id number]    Anesthesia Start:  9387 Anesthesia Stop:  0951    Procedure:  SHOULDER ARTHROSCOPY W/ROTATOR CUFF REPAIR (Left Shoulder) Diagnosis:  (LEFT SHOULDER ROTATOR CUFF TEAR)    Surgeon:  Yvette Barker MD Responsible Provider:  Krupa Granado MD    Anesthesia Type:  general, regional ASA Status:  3          Anesthesia Type: general, regional    Mahesh Phase I: Mahesh Score: 9    Mahesh Phase II: Mahesh Score: 10    Last vitals: Reviewed and per EMR flowsheets.        Anesthesia Post Evaluation    Comments: POST- ANESTHESIA EVALUATION       Pt Name: Britta Plasencia  MRN: [id number]  YOB: 1965  Date of evaluation: 5/22/2019  Time:  11:50 AM      BP (!) 97/57   Pulse 73   Temp 97.2 °F (36.2 °C) (Temporal)   Resp 16   Ht 5' 8.5\" (1.74 m)   Wt 194 lb (88 kg)   SpO2 94%   BMI 29.07 kg/m²      Consciousness Level  Awake  Cardiopulmonary Status  Stable  Pain Adequately Treated YES  Nausea / Vomiting  NO  Adequate Hydration  YES  Anesthesia Related Complications NONE      Electronically signed by Krupa Granado MD on 5/22/2019 at 11:50 AM

## 2019-05-22 NOTE — INTERVAL H&P NOTE
HISTORY and [facility] [id number]       NAME:  Collette Codding  MRN: [id number]   YOB: 1965   Date: 5/22/2019   Age: 46 y.o. Gender: female     H&P Update Note    H&P from 5/13/2019 reviewed and updated. Patient examined. INTERVAL HISTORY:     Patient is feeling well today, denies any fever/chills, chest pain, shortness of breath. No interval changes. No interval changes to past medical history, social history, family history. Review of systems as stated above and otherwise negative. PHYSICAL EXAM:     Vitals: /76   Pulse 79   Temp 97.7 °F (36.5 °C) (Oral)   Resp 16   Ht 5' 8.5\" (1.74 m)   Wt 194 lb (88 kg)   SpO2 99%   BMI 29.07 kg/m²  Body mass index is 29.07 kg/m². Patient is alert and oriented, in no distress. Normotensive. Heart rate and rhythm are regular. Lungs clear to auscultation bilaterally. Abdomen is soft, non tender. No pedal edema. No interval changes. I concur with the findings.      Alcon Armas, LORNA - CNP on 5/22/2019 at 6:22 AM

## 2019-05-22 NOTE — ANESTHESIA PRE PROCEDURE
Provider Last Rate Last Dose    0.9 % sodium chloride infusion   Intravenous Continuous [place] MD Abisai        lidocaine PF 1 % injection 1 mL  1 mL Intradermal Once PRN Johnathan Art MD        acetaminophen (TYLENOL) tablet 1,000 mg  1,000 mg Oral Once Jeannette Prieto MD        ceFAZolin (ANCEF) 2 g in dextrose 5 % 50 mL IVPB  2 g Intravenous On Call to [facility] [address], MD        dexamethasone (PF) (DECADRON) injection 10 mg  10 mg Intravenous Once Jeannette Prieto MD        sodium chloride flush 0.9 % injection 10 mL  10 mL Intravenous 2 times per day Jeannette Prieto MD        sodium chloride flush 0.9 % injection 10 mL  10 mL Intravenous PRN Jeannette Prieto MD           Allergies:  No Known Allergies    Problem List:    Patient Active Problem List   Diagnosis Code    Type II diabetes mellitus, uncontrolled ([facility] E11.65    Vitamin D deficiency E55.9    Mixed hyperlipidemia E78.2    Chronic pain of both shoulders M25.511, G89.29, M25.512    Left sided sciatica M54.32    Complete tear of left rotator cuff M75.122       Past Medical History:        Diagnosis Date    Cancer ([facility]     pre cancer cervix    Chronic pain of both shoulders 7/18/2018    Hyperlipidemia     Left sided sciatica 7/18/2018    Neuropathy     Type 2 diabetes mellitus ([facility]     Wears glasses        Past Surgical History:        Procedure Laterality Date    COLONOSCOPY      polyps removed x 3    COLPOSCOPY      DILATION AND CURETTAGE OF UTERUS      HYSTERECTOMY      complete       Social History:    Social History     Tobacco Use    Smoking status: Former Smoker    Smokeless tobacco: Never Used   Substance Use Topics    Alcohol use:  Yes     Alcohol/week: 0.0 oz     Comment: rare                                Counseling given: Not Answered      Vital Signs (Current):   Vitals:    05/22/19 0609   BP: 114/76   Pulse: 79   Resp: 16   Temp: 97.7 °F (36.5 °C)   TempSrc: Oral   SpO2: 99%   Weight: 194 lb (88 kg)   Height: 5'

## 2019-05-22 NOTE — OP NOTE
Operative Report     Date of Procedure: 5/22/2019      Preop Diagnosis: Left Rotator Cuff Tear (M75.122)     Postop Diagnosis: Left  Rotator Cuff Tear (M75.122)     Procedure:    1. Left Arthroscopic Rotator Cuff Repair (21915)  2. Left shoulder arthroscopic extensive debridement (46895)     Surgeon: Parisa Allen MD     Assistant: Shakira Buitrago DO (PGY 3)    Anesthesia: General with left shoulder interscalene nerve block    Blood Loss: Minimal    Findings: Full thickness tear of the supraspinatus and leading edge of the infraspinatus with some delamination posteriorly. Implants: [employer] 5.5mm bio-composite corkscrew anchors x 2 and 4.75 mm self-punching swivelok anchors x 2     Indications: Jonathan Huang is a 46 y.o. old female who presented with functionally limiting left shoulder pain despite non-operative measures. MRI confirmed the presence of a rotator cuff tear. Following a discussion with the patient regarding her treatment options, she elected to proceed with an arthroscopic left shoulder rotator cuff repair. she came to this decision after demonstrating a good understanding of our discussion. Risks, benefits, and alternatives were fully discussed. Postoperative limitations and limitations of the procedure were discussed in detail. The patient understood risks, alternatives, complications, & post-operative limitations and wishes to proceed. Operative Technique: Following appropriate identification of the patient and her operative extremity in the preoperative area, consent was reviewed with patient. Risks, benefits, and alternatives were discussed. All questions were answered. The anesthesia service administered a left interscalene nerve block. Patient was taken back to the OR and transferred onto the operative table. General anesthesia was administered and her airway was secured. The patient was carefully positioned and secured in the beach-chair position, padding all prominences.  SCDs repair. The repair was then reevaluated and found to be stable through a full range of motion. The camera was placed back into the joint, confirming restoration of the rotator cuff insertion with an air tight seal.      Incisions were closed using a 3-0 prolene followed by a sterile dressing by way of 4x4 gauze and Tegaderm. Her arm was placed in a sling/abduction pillow. The patient was awoken, transferred to her bed and wheeled to recovery in stable condition.      Complications: None     Condition: Stable

## 2019-05-30 LAB
EKG ATRIAL RATE: 82 BPM
EKG P AXIS: 44 DEGREES
EKG P-R INTERVAL: 178 MS
EKG Q-T INTERVAL: 386 MS
EKG QRS DURATION: 100 MS
EKG QTC CALCULATION (BAZETT): 450 MS
EKG R AXIS: -6 DEGREES
EKG T AXIS: 30 DEGREES
EKG VENTRICULAR RATE: 82 BPM

## 2019-06-04 ENCOUNTER — OFFICE VISIT (OUTPATIENT)
Dept: ORTHOPEDIC SURGERY | Age: 54
End: 2019-06-04

## 2019-06-04 VITALS — HEIGHT: 69 IN | BODY MASS INDEX: 28.29 KG/M2 | WEIGHT: 191 LBS

## 2019-06-04 DIAGNOSIS — M75.122 COMPLETE TEAR OF LEFT ROTATOR CUFF: Primary | ICD-10-CM

## 2019-06-04 DIAGNOSIS — Z98.890 STATUS POST ROTATOR CUFF REPAIR: ICD-10-CM

## 2019-06-04 PROCEDURE — 99024 POSTOP FOLLOW-UP VISIT: CPT | Performed by: ORTHOPAEDIC SURGERY

## 2019-06-04 NOTE — PROGRESS NOTES
Procedure: Left shoulder arthroscopic rotator cuff repair  Date of procedure: 5/22/19    HPI:  Dorothy Archuleta is a 46 y.o. female who is approximately 2 weeks status post aforementioned procedure. Indicates that she is doing relatively well. She pain is rated as a 2/10. She denies having any fevers, chills, sweats or any constitutional symptoms. She has been compliant with her pendulums and sling immobilization. Physical examination:  Evaluation of patient's left shoulder and upper extremity demonstrates her incisions to be clean, dry, intact. There is no warmth, erythema, wound dehiscence or drainage. Sensation is grossly intact light touch in all dermatomes and she has a 2+ radial pulse with brisk capillary refill in her fingers. Impression and plan:  Dorothy Archuleta is a 46 y.o. female who is 2 weeks status post an arthroscopic left shoulder rotator cuff repair. She is doing relatively well at this time. We reviewed her arthroscopic images. Her sutures were taken out and Steri-Strips and clean dressings applied. She may now get her incisions wet in the shower. She is to continue on with her immobilization. I'm getting get her started in formal physical therapy using guidelines for the repair of the repair of a medium rotator cuff tear; a prescription was provided. I'll see her back for reevaluation in 4 weeks but she was encouraged to return or call earlier with questions and/or concerns.

## 2019-07-09 ENCOUNTER — OFFICE VISIT (OUTPATIENT)
Dept: ORTHOPEDIC SURGERY | Age: 54
End: 2019-07-09

## 2019-07-09 VITALS — WEIGHT: 190 LBS | BODY MASS INDEX: 28.79 KG/M2 | HEIGHT: 68 IN

## 2019-07-09 DIAGNOSIS — Z98.890 STATUS POST ROTATOR CUFF REPAIR: Primary | ICD-10-CM

## 2019-07-09 PROCEDURE — 99024 POSTOP FOLLOW-UP VISIT: CPT | Performed by: ORTHOPAEDIC SURGERY

## 2019-07-22 ENCOUNTER — OFFICE VISIT (OUTPATIENT)
Dept: FAMILY MEDICINE CLINIC | Age: 54
End: 2019-07-22
Payer: COMMERCIAL

## 2019-07-22 VITALS
TEMPERATURE: 98 F | OXYGEN SATURATION: 98 % | SYSTOLIC BLOOD PRESSURE: 118 MMHG | WEIGHT: 196 LBS | DIASTOLIC BLOOD PRESSURE: 85 MMHG | HEART RATE: 84 BPM | BODY MASS INDEX: 29.7 KG/M2 | HEIGHT: 68 IN

## 2019-07-22 DIAGNOSIS — E11.65 UNCONTROLLED TYPE 2 DIABETES MELLITUS WITH HYPERGLYCEMIA (HCC): Primary | ICD-10-CM

## 2019-07-22 LAB — HBA1C MFR BLD: 7.9 %

## 2019-07-22 PROCEDURE — 99213 OFFICE O/P EST LOW 20 MIN: CPT | Performed by: NURSE PRACTITIONER

## 2019-07-22 PROCEDURE — 83036 HEMOGLOBIN GLYCOSYLATED A1C: CPT | Performed by: NURSE PRACTITIONER

## 2019-07-22 RX ORDER — GLIMEPIRIDE 4 MG/1
4 TABLET ORAL
Qty: 180 TABLET | Refills: 1 | Status: SHIPPED | OUTPATIENT
Start: 2019-07-22 | End: 2020-01-29 | Stop reason: SDUPTHER

## 2019-07-22 RX ORDER — BLOOD SUGAR DIAGNOSTIC
1 STRIP MISCELLANEOUS DAILY
Qty: 100 EACH | Refills: 3 | Status: SHIPPED | OUTPATIENT
Start: 2019-07-22 | End: 2022-06-14 | Stop reason: SDUPTHER

## 2019-07-22 ASSESSMENT — ENCOUNTER SYMPTOMS
CHEST TIGHTNESS: 0
NAUSEA: 0
COUGH: 0
VOMITING: 0
ABDOMINAL PAIN: 0
SHORTNESS OF BREATH: 0

## 2019-07-22 NOTE — PROGRESS NOTES
Visit Information    Have you changed or started any medications since your last visit including any over-the-counter medicines, vitamins, or herbal medicines? no   Have you stopped taking any of your medications? Is so, why? -  no  Are you having any side effects from any of your medications? - no    Have you seen any other physician or provider since your last visit?  no   Have you had any other diagnostic tests since your last visit?  no   Have you been seen in the emergency room and/or had an admission in a hospital since we last saw you?  no   Have you had your routine dental cleaning in the past 6 months?  no     Do you have an active MyChart account? If no, what is the barrier?   Yes    Patient Care Team:  LORNA Hansen CNP as PCP - General (Nurse Practitioner)  LORNA Hansen CNP as PCP - [facility] Provider    Medical History Review  Past Medical, Family, and Social History reviewed and  contribute to the patient presenting condition    Health Maintenance   Topic Date Due    Hepatitis B Vaccine (1 of 3 - Risk 3-dose series) 04/18/2020 (Originally 6/27/1983)    Cervical cancer screen  04/24/2020 (Originally 6/27/1985)    Shingles Vaccine (2 of 3) 07/10/2020 (Originally 12/5/2017)    DTaP/Tdap/Td vaccine (1 - Tdap) 10/26/2020 (Originally 6/27/1983)    Flu vaccine (1) 09/01/2019    Colon cancer screen colonoscopy  11/15/2019    Diabetic microalbuminuria test  01/21/2020    Lipid screen  01/24/2020    Diabetic retinal exam  03/08/2020    Diabetic foot exam  04/24/2020    A1C test (Diabetic or Prediabetic)  04/24/2020    Potassium monitoring  05/13/2020    Creatinine monitoring  05/13/2020    Breast cancer screen  08/06/2020    Pneumococcal 0-64 years Vaccine  Completed    Hepatitis C screen  Completed    HIV screen  Completed

## 2019-07-22 NOTE — PROGRESS NOTES
History:   Procedure Laterality Date    COLONOSCOPY      polyps removed x 3    COLPOSCOPY      DILATION AND CURETTAGE OF UTERUS      HYSTERECTOMY      complete    SHOULDER ARTHROSCOPY Left 2019    SHOULDER ARTHROSCOPY W/ROTATOR CUFF REPAIR performed by Rachel Woodard MD at [facility] History   Problem Relation Age of Onset    Asthma Mother     Obesity Mother     Diabetes Father     Cancer Father         prostate, skin, rectal    Diabetes Brother     Diabetes Paternal Aunt      Social History     Tobacco Use    Smoking status: Former Smoker     Packs/day: 2.00     Years: 6.00     Pack years: 12.00     Types: Cigarettes     Last attempt to quit: 1988     Years since quittin.0    Smokeless tobacco: Never Used   Substance Use Topics    Alcohol use: Yes     Alcohol/week: 0.0 standard drinks     Comment: rare      Current Outpatient Medications   Medication Sig Dispense Refill    glimepiride (AMARYL) 4 MG tablet Take 1 tablet by mouth 2 times daily (before meals) 180 tablet 1    ONETOUCH VERIO strip Inject 1 each into the skin daily 100 each 3    ondansetron (ZOFRAN) 4 MG tablet Take 1 tablet by mouth every 12 hours as needed for Nausea or Vomiting 20 tablet 0    SITagliptin-metFORMIN HCl -1000 MG TB24 Take 1 tablet by mouth daily 90 tablet 1    lisinopril (ZESTRIL) 2.5 MG tablet Take 1 tablet by mouth daily 30 tablet 5    atorvastatin (LIPITOR) 20 MG tablet Take 1 tablet by mouth daily 30 tablet 5    vitamin D (ERGOCALCIFEROL) 60932 UNITS CAPS capsule Take 1 capsule by mouth once a week for 8 doses 8 capsule 0     No current facility-administered medications for this visit.       No Known Allergies    Health Maintenance   Topic Date Due    Hepatitis B Vaccine (1 of 3 - Risk 3-dose series) 2020 (Originally 1983)    Cervical cancer screen  2020 (Originally 1985)    Shingles Vaccine (2 of 3) 07/10/2020 (Originally 2017)    DTaP/Tdap/Td vaccine (1 - Tdap) 10/26/2020 (Originally 6/27/1983)    Flu vaccine (1) 09/01/2019    Colon cancer screen colonoscopy  11/15/2019    Diabetic microalbuminuria test  01/21/2020    Lipid screen  01/24/2020    Diabetic retinal exam  03/08/2020    Diabetic foot exam  04/24/2020    A1C test (Diabetic or Prediabetic)  04/24/2020    Potassium monitoring  05/13/2020    Creatinine monitoring  05/13/2020    Breast cancer screen  08/06/2020    Pneumococcal 0-64 years Vaccine  Completed    Hepatitis C screen  Completed    HIV screen  Completed       Subjective:      Review of Systems   Constitutional: Negative for appetite change, chills, diaphoresis, fatigue and fever. Eyes: Negative for visual disturbance. Respiratory: Negative for cough, chest tightness and shortness of breath. Cardiovascular: Negative for chest pain, palpitations and leg swelling. Gastrointestinal: Negative for abdominal pain, nausea and vomiting. Endocrine: Negative for polydipsia, polyphagia and polyuria. Skin: Negative for rash and wound. Neurological: Negative for dizziness, weakness, light-headedness, numbness and headaches. Hematological: Negative for adenopathy. Psychiatric/Behavioral: Negative for sleep disturbance. Objective:      Physical Exam   Constitutional: She is oriented to person, place, and time. She appears well-developed and well-nourished. No distress. HENT:   Head: Normocephalic and atraumatic. Neck: Neck supple. Cardiovascular: Normal rate, regular rhythm, normal heart sounds and intact distal pulses. No LE edema   Pulmonary/Chest: Effort normal and breath sounds normal.   Neurological: She is alert and oriented to person, place, and time. Skin: Skin is warm and dry. She is not diaphoretic. Psychiatric: She has a normal mood and affect. Her behavior is normal. Judgment and thought content normal.   Nursing note and vitals reviewed. Assessment:       Diagnosis Orders   1.  Uncontrolled (4) rarely moist

## 2019-08-20 ENCOUNTER — OFFICE VISIT (OUTPATIENT)
Dept: ORTHOPEDIC SURGERY | Age: 54
End: 2019-08-20

## 2019-08-20 VITALS — HEIGHT: 69 IN | WEIGHT: 194 LBS | BODY MASS INDEX: 28.73 KG/M2

## 2019-08-20 DIAGNOSIS — Z98.890 STATUS POST ROTATOR CUFF REPAIR: Primary | ICD-10-CM

## 2019-08-20 PROCEDURE — 99024 POSTOP FOLLOW-UP VISIT: CPT | Performed by: ORTHOPAEDIC SURGERY

## 2019-08-20 NOTE — PROGRESS NOTES
Procedure: Left shoulder arthroscopic rotator cuff repair  Date of procedure: 5/22/19      HPI: Omer Zuniga is a 53 y.o. female who is approximately 12 weeks status post the aforementioned procedure. She states that she is doing well. She does have some pain in the shoulder when she reaches up and she rates it as a 2/10. She also reports some clicking in the shoulder. She is sleeping well and physical therapy has been going well for her. Physical examination:  Ht 5' 8.5\" (1.74 m)   Wt 194 lb (88 kg)   BMI 29.07 kg/m²   Evaluation of patient's left shoulder and upper extremity demonstrates her incisions to be healed appropriately. There is minimal to no swelling at this time. Sensation is grossly intact light touch in all dermatomes and she has a 2+ radial pulse. She can actively elevate and abduct 220 degrees in both planes and externally rotate with her arm adducted to her side to 60 degrees. Impression and plan: Omer Zuniga is a 53 y.o. female  who is approximately 12 weeks status post left shoulder arthroscopic rotator cuff repair. She is doing relatively well at this time. I anticipate improvement and complete resolution of any discomfort she has when she raises her arm up over time. In the meantime she is to continue working in physical therapy on her motion as well as gradual progressive strengthening. She can continue on with activities as tolerated limiting any lifting pushing or pulling at this point 2 to 3 pounds. A letter allowing her to return to work with these restrictions was provided in addition she is not to engage in any repetitive at or above shoulder activity. Per the patient she is not sure that her work will allow her to return with any restrictions. I will see her back for reevaluation in 3 months but she was encouraged to return or call earlier with questions and or concerns.

## 2019-10-07 ENCOUNTER — TELEPHONE (OUTPATIENT)
Dept: ORTHOPEDIC SURGERY | Age: 54
End: 2019-10-07

## 2019-10-07 DIAGNOSIS — Z98.890 STATUS POST ROTATOR CUFF REPAIR: Primary | ICD-10-CM

## 2019-10-07 DIAGNOSIS — M75.122 COMPLETE TEAR OF LEFT ROTATOR CUFF, UNSPECIFIED WHETHER TRAUMATIC: ICD-10-CM

## 2019-10-14 ENCOUNTER — OFFICE VISIT (OUTPATIENT)
Dept: FAMILY MEDICINE CLINIC | Age: 54
End: 2019-10-14
Payer: COMMERCIAL

## 2019-10-14 VITALS
HEART RATE: 82 BPM | HEIGHT: 68 IN | TEMPERATURE: 98.1 F | OXYGEN SATURATION: 98 % | WEIGHT: 197 LBS | DIASTOLIC BLOOD PRESSURE: 75 MMHG | SYSTOLIC BLOOD PRESSURE: 115 MMHG | BODY MASS INDEX: 29.86 KG/M2

## 2019-10-14 DIAGNOSIS — E11.65 UNCONTROLLED TYPE 2 DIABETES MELLITUS WITH HYPERGLYCEMIA (HCC): Primary | ICD-10-CM

## 2019-10-14 DIAGNOSIS — Z23 FLU VACCINE NEED: ICD-10-CM

## 2019-10-14 LAB — HBA1C MFR BLD: 10.4 %

## 2019-10-14 PROCEDURE — 3046F HEMOGLOBIN A1C LEVEL >9.0%: CPT | Performed by: NURSE PRACTITIONER

## 2019-10-14 PROCEDURE — 99213 OFFICE O/P EST LOW 20 MIN: CPT | Performed by: NURSE PRACTITIONER

## 2019-10-14 PROCEDURE — 83036 HEMOGLOBIN GLYCOSYLATED A1C: CPT | Performed by: NURSE PRACTITIONER

## 2019-10-14 PROCEDURE — 90471 IMMUNIZATION ADMIN: CPT | Performed by: NURSE PRACTITIONER

## 2019-10-14 PROCEDURE — 90686 IIV4 VACC NO PRSV 0.5 ML IM: CPT | Performed by: NURSE PRACTITIONER

## 2019-10-14 ASSESSMENT — ENCOUNTER SYMPTOMS
COUGH: 0
SHORTNESS OF BREATH: 0
CHEST TIGHTNESS: 0
DIARRHEA: 0
BLOOD IN STOOL: 0
NAUSEA: 0
VOMITING: 0
CONSTIPATION: 0
ABDOMINAL PAIN: 0

## 2019-11-01 ENCOUNTER — TELEPHONE (OUTPATIENT)
Dept: FAMILY MEDICINE CLINIC | Age: 54
End: 2019-11-01

## 2019-11-12 ENCOUNTER — OFFICE VISIT (OUTPATIENT)
Dept: FAMILY MEDICINE CLINIC | Age: 54
End: 2019-11-12
Payer: COMMERCIAL

## 2019-11-12 VITALS
WEIGHT: 195 LBS | DIASTOLIC BLOOD PRESSURE: 74 MMHG | HEART RATE: 98 BPM | TEMPERATURE: 98.1 F | BODY MASS INDEX: 29.55 KG/M2 | HEIGHT: 68 IN | OXYGEN SATURATION: 97 % | SYSTOLIC BLOOD PRESSURE: 115 MMHG

## 2019-11-12 DIAGNOSIS — E11.65 UNCONTROLLED TYPE 2 DIABETES MELLITUS WITH HYPERGLYCEMIA (HCC): Primary | ICD-10-CM

## 2019-11-12 LAB — GLUCOSE, WHOLE BLOOD: 286

## 2019-11-12 PROCEDURE — 99213 OFFICE O/P EST LOW 20 MIN: CPT | Performed by: NURSE PRACTITIONER

## 2019-11-12 PROCEDURE — 82947 ASSAY GLUCOSE BLOOD QUANT: CPT | Performed by: NURSE PRACTITIONER

## 2019-11-12 ASSESSMENT — ENCOUNTER SYMPTOMS
COUGH: 0
CHEST TIGHTNESS: 0
NAUSEA: 0
VOMITING: 0
SHORTNESS OF BREATH: 0
ABDOMINAL PAIN: 0

## 2019-11-18 ENCOUNTER — HOSPITAL ENCOUNTER (OUTPATIENT)
Age: 54
Setting detail: SPECIMEN
Discharge: HOME | End: 2019-11-18
Payer: COMMERCIAL

## 2019-11-19 ENCOUNTER — OFFICE VISIT (OUTPATIENT)
Dept: ORTHOPEDIC SURGERY | Age: 54
End: 2019-11-19
Payer: COMMERCIAL

## 2019-11-19 VITALS — WEIGHT: 187 LBS | BODY MASS INDEX: 27.7 KG/M2 | HEIGHT: 69 IN

## 2019-11-19 DIAGNOSIS — M75.122 COMPLETE TEAR OF LEFT ROTATOR CUFF, UNSPECIFIED WHETHER TRAUMATIC: Primary | ICD-10-CM

## 2019-11-19 DIAGNOSIS — Z98.890 STATUS POST ROTATOR CUFF REPAIR: ICD-10-CM

## 2019-11-19 PROCEDURE — 99213 OFFICE O/P EST LOW 20 MIN: CPT | Performed by: ORTHOPAEDIC SURGERY

## 2019-11-21 ENCOUNTER — TELEPHONE (OUTPATIENT)
Dept: ORTHOPEDIC SURGERY | Age: 54
End: 2019-11-21

## 2019-11-25 LAB — SURGICAL PATHOLOGY REPORT: NORMAL

## 2019-12-09 RX ORDER — SITAGLIPTIN AND METFORMIN HYDROCHLORIDE 100; 1000 MG/1; MG/1
TABLET, FILM COATED, EXTENDED RELEASE ORAL
Qty: 90 TABLET | Refills: 0 | Status: SHIPPED | OUTPATIENT
Start: 2019-12-09 | End: 2020-01-14 | Stop reason: ALTCHOICE

## 2020-01-14 ENCOUNTER — OFFICE VISIT (OUTPATIENT)
Dept: FAMILY MEDICINE CLINIC | Age: 55
End: 2020-01-14
Payer: COMMERCIAL

## 2020-01-14 VITALS
HEART RATE: 76 BPM | SYSTOLIC BLOOD PRESSURE: 114 MMHG | DIASTOLIC BLOOD PRESSURE: 80 MMHG | BODY MASS INDEX: 28.58 KG/M2 | HEIGHT: 69 IN | TEMPERATURE: 97.5 F | WEIGHT: 193 LBS | OXYGEN SATURATION: 97 %

## 2020-01-14 LAB
CREATININE URINE POCT: 100
HBA1C MFR BLD: 9.7 %
MICROALBUMIN/CREAT 24H UR: 80 MG/G{CREAT}
MICROALBUMIN/CREAT UR-RTO: <30

## 2020-01-14 PROCEDURE — 82044 UR ALBUMIN SEMIQUANTITATIVE: CPT | Performed by: NURSE PRACTITIONER

## 2020-01-14 PROCEDURE — 83036 HEMOGLOBIN GLYCOSYLATED A1C: CPT | Performed by: NURSE PRACTITIONER

## 2020-01-14 PROCEDURE — 99213 OFFICE O/P EST LOW 20 MIN: CPT | Performed by: NURSE PRACTITIONER

## 2020-01-14 PROCEDURE — 3046F HEMOGLOBIN A1C LEVEL >9.0%: CPT | Performed by: NURSE PRACTITIONER

## 2020-01-14 ASSESSMENT — ENCOUNTER SYMPTOMS
COUGH: 0
NAUSEA: 0
SHORTNESS OF BREATH: 0
VOMITING: 0
ABDOMINAL PAIN: 0
CHEST TIGHTNESS: 0

## 2020-01-14 ASSESSMENT — PATIENT HEALTH QUESTIONNAIRE - PHQ9
2. FEELING DOWN, DEPRESSED OR HOPELESS: 0
SUM OF ALL RESPONSES TO PHQ QUESTIONS 1-9: 0
SUM OF ALL RESPONSES TO PHQ QUESTIONS 1-9: 0
SUM OF ALL RESPONSES TO PHQ9 QUESTIONS 1 & 2: 0
1. LITTLE INTEREST OR PLEASURE IN DOING THINGS: 0

## 2020-01-14 NOTE — PROGRESS NOTES
Visit Information    Have you changed or started any medications since your last visit including any over-the-counter medicines, vitamins, or herbal medicines? no   Have you stopped taking any of your medications? Is so, why? -  no  Are you having any side effects from any of your medications? - no    Have you seen any other physician or provider since your last visit?  no   Have you had any other diagnostic tests since your last visit?  no   Have you been seen in the emergency room and/or had an admission in a hospital since we last saw you?  no   Have you had your routine dental cleaning in the past 6 months?  no     Do you have an active MyChart account? If no, what is the barrier?   Yes    Patient Care Team:  LORNA Soto CNP as PCP - General (Nurse Practitioner)  LORNA Soto CNP as PCP - [facility] Provider    Medical History Review  Past Medical, Family, and Social History reviewed and  contribute to the patient presenting condition    Health Maintenance   Topic Date Due    A1C test (Diabetic or Prediabetic)  01/14/2020    Diabetic microalbuminuria test  01/21/2020    Lipid screen  01/24/2020    Hepatitis B vaccine (1 of 3 - Risk 3-dose series) 04/18/2020 (Originally 6/27/1983)    Cervical cancer screen  04/24/2020 (Originally 6/27/1985)    Shingles Vaccine (2 of 3) 07/10/2020 (Originally 12/5/2017)    DTaP/Tdap/Td vaccine (1 - Tdap) 10/26/2020 (Originally 6/27/1975)    Diabetic retinal exam  03/08/2020    Diabetic foot exam  04/24/2020    Potassium monitoring  05/13/2020    Creatinine monitoring  05/13/2020    Breast cancer screen  08/06/2020    Colon cancer screen colonoscopy  11/18/2022    Flu vaccine  Completed    Pneumococcal 0-64 years Vaccine  Completed    Hepatitis C screen  Completed    HIV screen  Completed

## 2020-01-14 NOTE — PROGRESS NOTES
[facility] [place]  [address]  [place], [address]  [phone]      Eloina Wilks is a 53 y.o. female who presents today for her  medicalconditions/complaints as noted below. Eloina Wilks is c/o of Diabetes  . HPI:    Pt recently moved to [facility] and will be looking for endo there    Diabetes   She presents for her follow-up diabetic visit. She has type 2 diabetes mellitus. Her disease course has been fluctuating. There are no hypoglycemic associated symptoms. Pertinent negatives for hypoglycemia include no dizziness or headaches. There are no diabetic associated symptoms. Pertinent negatives for diabetes include no chest pain, no fatigue, no polydipsia, no polyphagia, no polyuria and no weakness. There are no hypoglycemic complications. Pertinent negatives for diabetic complications include no heart disease, nephropathy, peripheral neuropathy or PVD. Risk factors for coronary artery disease include post-menopausal, sedentary lifestyle and dyslipidemia. Current diabetic treatment includes diet and oral agent (monotherapy) (trulicity 1.5 mg weekly). She is compliant with treatment all of the time. Her weight is fluctuating minimally. She is following a diabetic and generally healthy diet. Meal planning includes avoidance of concentrated sweets and ADA exchanges. She has not had a previous visit with a dietitian. She participates in exercise intermittently. Her home blood glucose trend is increasing steadily. Her overall blood glucose range is >200 mg/dl. An ACE inhibitor/angiotensin II receptor blocker is being taken. Eye exam is current.        Past Medical History:   Diagnosis Date    Cancer [facility])     pre cancer cervix    Chronic pain of both shoulders 7/18/2018    Hyperlipidemia     Left sided sciatica 7/18/2018    Neuropathy     Type 2 diabetes mellitus ([address]     Wears glasses       Past Surgical History:   Procedure Laterality Date    COLONOSCOPY      polyps removed x 3    COLPOSCOPY      DILATION AND CURETTAGE OF UTERUS      HYSTERECTOMY      complete    SHOULDER ARTHROSCOPY Left 2019    SHOULDER ARTHROSCOPY W/ROTATOR CUFF REPAIR performed by Alis Holm MD at [facility] History   Problem Relation Age of Onset    Asthma Mother     Obesity Mother     Diabetes Father     Cancer Father         prostate, skin, rectal    Diabetes Brother     Diabetes Paternal Aunt      Social History     Tobacco Use    Smoking status: Former Smoker     Packs/day: 2.00     Years: 6.00     Pack years: 12.00     Types: Cigarettes     Last attempt to quit: 1988     Years since quittin.5    Smokeless tobacco: Never Used   Substance Use Topics    Alcohol use: Yes     Alcohol/week: 0.0 standard drinks     Comment: rare      Current Outpatient Medications   Medication Sig Dispense Refill    Dulaglutide 1.5 MG/0.5ML SOPN Inject 1.5 mg into the skin once a week 10 mL 5    Dapagliflozin-metFORMIN HCl ER  MG TB24 Take 1 tablet by mouth daily 30 tablet 5    atorvastatin (LIPITOR) 20 MG tablet TAKE 1 TABLET BY MOUTH EVERY DAY 30 tablet 5    lisinopril (PRINIVIL;ZESTRIL) 2.5 MG tablet TAKE 1 TABLET BY MOUTH EVERY DAY 30 tablet 5    glimepiride (AMARYL) 4 MG tablet Take 1 tablet by mouth 2 times daily (before meals) 180 tablet 1    ONETOUCH VERIO strip Inject 1 each into the skin daily 100 each 3    ondansetron (ZOFRAN) 4 MG tablet Take 1 tablet by mouth every 12 hours as needed for Nausea or Vomiting 20 tablet 0    vitamin D (ERGOCALCIFEROL) 91458 UNITS CAPS capsule Take 1 capsule by mouth once a week for 8 doses 8 capsule 0     No current facility-administered medications for this visit.       No Known Allergies    Health Maintenance   Topic Date Due    A1C test (Diabetic or Prediabetic)  2020    Diabetic microalbuminuria test  2020    Lipid screen  2020    Hepatitis B vaccine (1 of 3 - Risk 3-dose series) 2020 (Originally 1983)  Cervical cancer screen  04/24/2020 (Originally 6/27/1985)    Shingles Vaccine (2 of 3) 07/10/2020 (Originally 12/5/2017)    DTaP/Tdap/Td vaccine (1 - Tdap) 10/26/2020 (Originally 6/27/1975)    Diabetic retinal exam  03/08/2020    Diabetic foot exam  04/24/2020    Potassium monitoring  05/13/2020    Creatinine monitoring  05/13/2020    Breast cancer screen  08/06/2020    Colon cancer screen colonoscopy  11/18/2022    Flu vaccine  Completed    Pneumococcal 0-64 years Vaccine  Completed    Hepatitis C screen  Completed    HIV screen  Completed       Subjective:      Review of Systems   Constitutional: Negative for appetite change, chills, diaphoresis, fatigue and fever. Eyes: Negative for visual disturbance. Respiratory: Negative for cough, chest tightness and shortness of breath. Cardiovascular: Negative for chest pain, palpitations and leg swelling. Gastrointestinal: Negative for abdominal pain, nausea and vomiting. Endocrine: Negative for polydipsia, polyphagia and polyuria. Musculoskeletal: Negative for arthralgias and myalgias. Skin: Negative for rash and wound. Neurological: Negative for dizziness, weakness, light-headedness, numbness and headaches. Hematological: Negative for adenopathy. Psychiatric/Behavioral: Negative for sleep disturbance. Objective:      Physical Exam  Vitals signs and nursing note reviewed. Constitutional:       General: She is not in acute distress. Appearance: Normal appearance. She is well-developed. She is not ill-appearing or diaphoretic. HENT:      Head: Normocephalic and atraumatic. Neck:      Musculoskeletal: Neck supple. Cardiovascular:      Rate and Rhythm: Normal rate and regular rhythm. Heart sounds: Normal heart sounds. Comments: No LE edema  Pulmonary:      Effort: Pulmonary effort is normal.      Breath sounds: Normal breath sounds. Skin:     General: Skin is warm and dry.       Capillary Refill: Capillary microalbumin     Orders Placed This Encounter   Medications    Dulaglutide 1.5 MG/0.5ML SOPN     Sig: Inject 1.5 mg into the skin once a week     Dispense:  10 mL     Refill:  5    Dapagliflozin-metFORMIN HCl ER  MG TB24     Sig: Take 1 tablet by mouth daily     Dispense:  30 tablet     Refill:  5   DM:  Uncontrolled  Add xigduo to [employer] and manjeet  Advised to start looking for endo options near [facility] and PCP  Continue ACE and statin    Lipids:  Controlled? LD diet and regular exercise  Update routine labs  Will call with test results and fax to new pcp when find one      Patient given educational materials - see patient instructions. Discussed use,benefit, and side effects of prescribed medications. All patient questions answered. Pt voiced understanding. Reviewed health maintenance. Instructed to continue currentmedications, diet and exercise.     Electronically signed by Nate Barber CNP on 1/14/2020 at 10:18 AM

## 2020-01-24 ENCOUNTER — PATIENT MESSAGE (OUTPATIENT)
Dept: FAMILY MEDICINE CLINIC | Age: 55
End: 2020-01-24

## 2020-01-24 LAB
ALT SERPL-CCNC: 32 U/L
AST SERPL-CCNC: 13 U/L
BUN BLDV-MCNC: 16 MG/DL
CALCIUM SERPL-MCNC: 9.7 MG/DL
CHLORIDE BLD-SCNC: 107 MMOL/L
CHOLESTEROL, TOTAL: 151 MG/DL
CHOLESTEROL/HDL RATIO: 2.5
CO2: 30 MMOL/L
CREAT SERPL-MCNC: 0.63 MG/DL
GFR CALCULATED: NORMAL
GLUCOSE BLD-MCNC: 96 MG/DL
HDLC SERPL-MCNC: 38 MG/DL (ref 35–70)
LDL CHOLESTEROL CALCULATED: 94 MG/DL (ref 0–160)
POTASSIUM SERPL-SCNC: 3.8 MMOL/L
SODIUM BLD-SCNC: 142 MMOL/L
TRIGL SERPL-MCNC: 97 MG/DL
VITAMIN B-12: 362
VLDLC SERPL CALC-MCNC: 19 MG/DL

## 2020-01-24 NOTE — TELEPHONE ENCOUNTER
From: Mine Hassan  To: LORNA Barry - CNP  Sent: 1/24/2020 3:17 PM EST  Subject: Visit Follow-Up Question    Ross Head just wanted to let you know that the new medication is working great , I am averaging right around 98 to 137 on my blood sugars.

## 2020-01-29 RX ORDER — GLIMEPIRIDE 4 MG/1
4 TABLET ORAL
Qty: 180 TABLET | Refills: 1 | Status: SHIPPED | OUTPATIENT
Start: 2020-01-29 | End: 2020-03-09

## 2020-01-29 RX ORDER — ATORVASTATIN CALCIUM 20 MG/1
TABLET, FILM COATED ORAL
Qty: 30 TABLET | Refills: 5 | OUTPATIENT
Start: 2020-01-29

## 2020-01-29 RX ORDER — LISINOPRIL 2.5 MG/1
TABLET ORAL
Qty: 90 TABLET | Refills: 1 | Status: SHIPPED | OUTPATIENT
Start: 2020-01-29 | End: 2020-07-05

## 2020-01-30 NOTE — TELEPHONE ENCOUNTER
The form was shredded yesterday, ill try to find it. I called [facility] and they told me all they had on file was 10mg but if you didn't change anything you can just send the request for 20mg. I lvm with patient to cb to clarify what dosage she is taking.

## 2020-01-31 NOTE — TELEPHONE ENCOUNTER
Patient called back and she has been taking 10 mg  And please send thru [facility]  Atorvastatin

## 2020-02-02 RX ORDER — ATORVASTATIN CALCIUM 10 MG/1
10 TABLET, FILM COATED ORAL DAILY
Qty: 90 TABLET | Refills: 1 | Status: SHIPPED | OUTPATIENT
Start: 2020-02-02 | End: 2020-07-08

## 2020-02-27 ENCOUNTER — OFFICE VISIT (OUTPATIENT)
Dept: FAMILY MEDICINE CLINIC | Age: 55
End: 2020-02-27
Payer: COMMERCIAL

## 2020-02-27 VITALS
OXYGEN SATURATION: 94 % | SYSTOLIC BLOOD PRESSURE: 114 MMHG | DIASTOLIC BLOOD PRESSURE: 67 MMHG | BODY MASS INDEX: 27.55 KG/M2 | TEMPERATURE: 98.3 F | HEART RATE: 104 BPM | HEIGHT: 69 IN | WEIGHT: 186 LBS

## 2020-02-27 LAB — S PYO AG THROAT QL: POSITIVE

## 2020-02-27 PROCEDURE — 87880 STREP A ASSAY W/OPTIC: CPT | Performed by: NURSE PRACTITIONER

## 2020-02-27 PROCEDURE — 99213 OFFICE O/P EST LOW 20 MIN: CPT | Performed by: NURSE PRACTITIONER

## 2020-02-27 PROCEDURE — 96372 THER/PROPH/DIAG INJ SC/IM: CPT | Performed by: NURSE PRACTITIONER

## 2020-02-27 RX ORDER — AMOXICILLIN 875 MG/1
875 TABLET, COATED ORAL 2 TIMES DAILY
Qty: 20 TABLET | Refills: 0 | Status: SHIPPED | OUTPATIENT
Start: 2020-02-27 | End: 2020-03-08

## 2020-02-27 RX ORDER — CEFTRIAXONE 1 G/1
1 INJECTION, POWDER, FOR SOLUTION INTRAMUSCULAR; INTRAVENOUS ONCE
Status: COMPLETED | OUTPATIENT
Start: 2020-02-27 | End: 2020-02-27

## 2020-02-27 RX ADMIN — CEFTRIAXONE 1 G: 1 INJECTION, POWDER, FOR SOLUTION INTRAMUSCULAR; INTRAVENOUS at 09:33

## 2020-02-27 ASSESSMENT — ENCOUNTER SYMPTOMS
COUGH: 0
TROUBLE SWALLOWING: 0
COLOR CHANGE: 0
BOWEL INCONTINENCE: 0
SORE THROAT: 1
RHINORRHEA: 0
SINUS PRESSURE: 0
SWOLLEN GLANDS: 1
CHEST TIGHTNESS: 0
ABDOMINAL PAIN: 0
VISUAL CHANGE: 0
SHORTNESS OF BREATH: 0

## 2020-02-27 NOTE — PROGRESS NOTES
[facility] [place]  [address]  [place], [address]  [phone]      Janeth Oconnell is a 53 y.o. female who presents today for her  medicalconditions/complaints as noted below. Janeth Oconnell is c/o of Swelling (tonsils,garling with salt water which helps,drainage,symptoms since SAt while she was in [place]) and Fall (fell on right side,having pain under right breast)  . HPI:    Was exposed to strep by grandchildren recently. Fall   The accident occurred 5 to 7 days ago. The fall occurred while standing (while reaching for child and fell onto side). She fell from a height of 3 to 5 ft. Point of impact: right ribs/side. Pain location: tender along right ribs and side. The pain is mild. The symptoms are aggravated by cold. Associated symptoms include a fever and headaches. Pertinent negatives include no abdominal pain, bowel incontinence, hearing loss, hematuria, loss of consciousness, numbness, tingling or visual change. She has tried NSAID, ice and heat for the symptoms. The treatment provided mild relief. Pharyngitis   This is a new problem. The current episode started in the past 7 days. The problem occurs constantly. The problem has been gradually worsening. Associated symptoms include anorexia, chills, fatigue, a fever, headaches, a sore throat and swollen glands. Pertinent negatives include no abdominal pain, arthralgias (right lateral ribs), chest pain, congestion, coughing, diaphoresis, numbness, rash, visual change or weakness. The symptoms are aggravated by swallowing, eating, drinking, coughing and exertion. She has tried acetaminophen, NSAIDs, ice and drinking for the symptoms. The treatment provided no relief.        Past Medical History:   Diagnosis Date    Cancer [facility])     pre cancer cervix    Chronic pain of both shoulders 7/18/2018    Hyperlipidemia     Left sided sciatica 7/18/2018    Neuropathy     Type 2 diabetes mellitus ([facility]     Wears glasses       Past 03/08/2020    Hepatitis B vaccine (1 of 3 - Risk 3-dose series) 04/18/2020 (Originally 6/27/1983)    Cervical cancer screen  04/24/2020 (Originally 6/27/1985)    Shingles Vaccine (2 of 3) 07/10/2020 (Originally 12/5/2017)    DTaP/Tdap/Td vaccine (1 - Tdap) 10/26/2020 (Originally 6/27/1975)    A1C test (Diabetic or Prediabetic)  04/14/2020    Diabetic foot exam  04/24/2020    Breast cancer screen  08/06/2020    Diabetic microalbuminuria test  01/14/2021    Lipid screen  01/24/2021    Potassium monitoring  01/24/2021    Creatinine monitoring  01/24/2021    Colon cancer screen colonoscopy  11/18/2022    Flu vaccine  Completed    Pneumococcal 0-64 years Vaccine  Completed    Hepatitis C screen  Completed    HIV screen  Completed    Hepatitis A vaccine  Aged Out    Hib vaccine  Aged Out    Meningococcal (ACWY) vaccine  Aged Out       Subjective:      Review of Systems   Constitutional: Positive for activity change, appetite change, chills, fatigue and fever. Negative for diaphoresis and unexpected weight change. HENT: Positive for sore throat. Negative for congestion, ear discharge, ear pain, hearing loss, postnasal drip, rhinorrhea, sinus pressure, sneezing and trouble swallowing (painful). Respiratory: Negative for cough, chest tightness and shortness of breath. Cardiovascular: Negative for chest pain and palpitations. Gastrointestinal: Positive for anorexia. Negative for abdominal pain and bowel incontinence. Genitourinary: Negative for difficulty urinating and hematuria. Musculoskeletal: Negative for arthralgias (right lateral ribs). Skin: Negative for color change, pallor, rash and wound. Allergic/Immunologic: Negative for immunocompromised state. Neurological: Positive for headaches. Negative for dizziness, tingling, loss of consciousness, weakness and numbness. Hematological: Positive for adenopathy. Psychiatric/Behavioral: Positive for sleep disturbance.

## 2020-03-09 RX ORDER — GLIMEPIRIDE 4 MG/1
TABLET ORAL
Qty: 180 TABLET | Refills: 1 | Status: SHIPPED | OUTPATIENT
Start: 2020-03-09 | End: 2020-08-25 | Stop reason: SDUPTHER

## 2020-03-11 ENCOUNTER — PATIENT MESSAGE (OUTPATIENT)
Dept: FAMILY MEDICINE CLINIC | Age: 55
End: 2020-03-11

## 2020-03-11 RX ORDER — AZITHROMYCIN 250 MG/1
250 TABLET, FILM COATED ORAL SEE ADMIN INSTRUCTIONS
Qty: 6 TABLET | Refills: 0 | Status: SHIPPED | OUTPATIENT
Start: 2020-03-11 | End: 2020-03-16

## 2020-03-11 NOTE — TELEPHONE ENCOUNTER
From: Mine Hassan  To: Kimberlee Lagunas APRN - CNP  Sent: 3/11/2020 8:40 AM EDT  Subject: Visit Follow-Up Question    Hi Sheridan Mosquera been off the max a Sillen for a few days now but I can feel my throat starting to swell back up and hurt so I think my strap might be coming back I've continued to have night sweats extremely tired and some coughing it went down to my lungs are you sure I don't have the coronavirus LOL. also on another note my insurance is not paying for the Xigduo anymore. They wanted $400 for 30 days or 280 for 90 days.

## 2020-06-16 ENCOUNTER — PATIENT MESSAGE (OUTPATIENT)
Dept: FAMILY MEDICINE CLINIC | Age: 55
End: 2020-06-16

## 2020-06-16 NOTE — TELEPHONE ENCOUNTER
From: Nancy Schrader  To: Ruthie Preciado, LORNA - JORGE A  Sent: 6/16/2020 9:55 AM EDT  Subject: Prescription Question    Anam Abraham ,   I still haven't gotten a new doctor so I'm gonna stay with you for a while, I need some more of that Xig duo and I need it in a 90 day supply sent to the [facility]. sorry I'm completely out.

## 2020-06-17 ENCOUNTER — PATIENT MESSAGE (OUTPATIENT)
Dept: FAMILY MEDICINE CLINIC | Age: 55
End: 2020-06-17

## 2020-06-17 NOTE — TELEPHONE ENCOUNTER
From: Hernan Dos Santos  To: LORNA Gutiérrez - CNP  Sent: 6/17/2020 3:42 PM EDT  Subject: Prescription Question    Anam Barahona,  I was just at the [facility] and they said they didn't get the prescription yet, I'm assuming you probably sent it but could you send it again thank you.

## 2020-07-05 RX ORDER — LISINOPRIL 2.5 MG/1
TABLET ORAL
Qty: 90 TABLET | Refills: 3 | Status: SHIPPED | OUTPATIENT
Start: 2020-07-05 | Stop reason: SDUPTHER

## 2020-07-15 ENCOUNTER — OFFICE VISIT (OUTPATIENT)
Dept: FAMILY MEDICINE CLINIC | Age: 55
End: 2020-07-15
Payer: COMMERCIAL

## 2020-07-15 VITALS
TEMPERATURE: 98.2 F | WEIGHT: 188 LBS | BODY MASS INDEX: 27.97 KG/M2 | SYSTOLIC BLOOD PRESSURE: 117 MMHG | OXYGEN SATURATION: 97 % | HEART RATE: 79 BPM | DIASTOLIC BLOOD PRESSURE: 78 MMHG

## 2020-07-15 PROCEDURE — 99202 OFFICE O/P NEW SF 15 MIN: CPT | Performed by: NURSE PRACTITIONER

## 2020-07-15 RX ORDER — CYCLOBENZAPRINE HCL 10 MG
10 TABLET ORAL NIGHTLY PRN
Qty: 30 TABLET | Refills: 0 | Status: SHIPPED | OUTPATIENT
Start: 2020-07-15 | End: 2020-07-25

## 2020-07-15 ASSESSMENT — ENCOUNTER SYMPTOMS
COUGH: 0
SINUS PAIN: 0
ABDOMINAL PAIN: 0
SORE THROAT: 0
DIARRHEA: 0
NAUSEA: 0
VOMITING: 0
SHORTNESS OF BREATH: 0

## 2020-07-15 NOTE — PROGRESS NOTES
[facility]  [address] [address]  Dept: [phone]  Dept Fax: [phone]    Kala Her is a 63 y.o. female who presents to the urgent care today for her medicalconditions/complaints as noted below. Kala Her is c/o of Neck Pain (PT COMPLAINS OF PAIN HEADACHES AND STIFFNESS x2 WEEKS (INJ FALL) )      HPI:       71-year-old female patient presents with complaint of accidental fall. Reports that 3 weeks ago she was playing with 1 of her grandchildren on her bed when she fell off landing on the right side of her head she did hear and feel a pop in her neck. Denies any loss of consciousness. Had headache and neck pain following the injury. Reports she figured the symptoms would resolve spontaneously however over the past 3 weeks has noticed continued headaches, neck pain. Denies numbness or tingling to arms. Denies any history of neck problems or surgeries. Denies any vision changes. Treatments tried include Advil, Aleve.       Past Medical History:   Diagnosis Date    Cancer [facility])     pre cancer cervix    Chronic pain of both shoulders 7/18/2018    Hyperlipidemia     Left sided sciatica 7/18/2018    Neuropathy     Type 2 diabetes mellitus (HCC)     Wears glasses         Current Outpatient Medications   Medication Sig Dispense Refill    cyclobenzaprine (FLEXERIL) 10 MG tablet Take 1 tablet by mouth nightly as needed for Muscle spasms 30 tablet 0    atorvastatin (LIPITOR) 10 MG tablet TAKE 1 TABLET DAILY 90 tablet 1    lisinopril (PRINIVIL;ZESTRIL) 2.5 MG tablet TAKE 1 TABLET DAILY 90 tablet 3    Dapagliflozin-metFORMIN HCl ER  MG TB24 Take 1 tablet by mouth daily 30 tablet 5    glimepiride (AMARYL) 4 MG tablet TAKE 1 TABLET BY MOUTH TWICE DAILY BEFORE MEALS 180 tablet 1    TRULICITY 1.5 DA/3.4CF SOPN INJECT 1.5 MG INTO THE SKIN ONCE A WEEK 2 mL 3    ONETOUCH VERIO strip Inject 1 each into the skin daily 100 each 3    ondansetron (ZOFRAN) 4 MG tablet Take 1 tablet by mouth every 12 hours as needed for Nausea or Vomiting 20 tablet 0    vitamin D (ERGOCALCIFEROL) 02702 UNITS CAPS capsule Take 1 capsule by mouth once a week for 8 doses 8 capsule 0     No current facility-administered medications for this visit. No Known Allergies    Subjective:      Review of Systems   Constitutional: Negative for chills and fever. HENT: Negative for ear pain, sinus pain and sore throat. Respiratory: Negative for cough and shortness of breath. Cardiovascular: Negative for chest pain and palpitations. Gastrointestinal: Negative for abdominal pain, diarrhea, nausea and vomiting. Musculoskeletal: Positive for neck pain and neck stiffness. Neurological: Positive for headaches. Negative for dizziness. All other systems reviewed and are negative. Objective:     Physical Exam  Vitals signs and nursing note reviewed. Constitutional:       Appearance: Normal appearance. HENT:      Head: No raccoon eyes, Collier's sign or contusion. Nose: Nose normal.      Mouth/Throat:      Mouth: Mucous membranes are moist.   Eyes:      Extraocular Movements: Extraocular movements intact. Pupils: Pupils are equal, round, and reactive to light. Cardiovascular:      Rate and Rhythm: Normal rate. Pulmonary:      Effort: Pulmonary effort is normal.      Breath sounds: Normal breath sounds. Musculoskeletal:      Cervical back: She exhibits tenderness and bony tenderness. Skin:     General: Skin is warm and dry. Neurological:      General: No focal deficit present. Mental Status: She is alert and oriented to person, place, and time. /78 (Site: Left Upper Arm, Position: Sitting)   Pulse 79   Temp 98.2 °F (36.8 °C) (Oral)   Wt 188 lb (85.3 kg)   SpO2 97%   BMI 27.97 kg/m²   Lab Review   No visits with results within 2 Month(s) from this visit.    Latest known visit with results is:   Office Visit on 02/27/2020   Component Date Value    Strep A Ag 02/27/2020 Positive*       Assessment:       Diagnosis Orders   1. Closed head injury, initial encounter  CT HEAD WO CONTRAST    CT CERVICAL SPINE WO CONTRAST    cyclobenzaprine (FLEXERIL) 10 MG tablet   2. Neck pain  CT HEAD WO CONTRAST    CT CERVICAL SPINE WO CONTRAST    cyclobenzaprine (FLEXERIL) 10 MG tablet       Plan:      Return if symptoms worsen or fail to improve. Orders Placed This Encounter   Medications    cyclobenzaprine (FLEXERIL) 10 MG tablet     Sig: Take 1 tablet by mouth nightly as needed for Muscle spasms     Dispense:  30 tablet     Refill:  0     Plan for treatment of pain with muscle relaxant nightly. Discussed CT had and cervical spine we will follow-up on results. Return for any worsening, follow-up with PCP       Patient given educational materials - see patient instructions. Discussed use, benefit, and side effects of prescribed medications. All patientquestions answered. Pt voiced understanding. This note was transcribed using dictation with Dragon services. Efforts were made to correct any errors but some words may be misinterpreted.      Electronically signed by LORNA Guzman CNP on 7/15/2020at 11:54 AM

## 2020-07-15 NOTE — PATIENT INSTRUCTIONS
Patient Education        Neck Pain: Care Instructions  Your Care Instructions     You can have neck pain anywhere from the bottom of your head to the top of your shoulders. It can spread to the upper back or arms. Injuries, painting a ceiling, sleeping with your neck twisted, staying in one position for too long, and many other activities can cause neck pain. Most neck pain gets better with home care. Your doctor may recommend medicine to relieve pain or relax your muscles. He or she may suggest exercise and physical therapy to increase flexibility and relieve stress. You may need to wear a special (cervical) collar to support your neck for a day or two. Follow-up care is a key part of your treatment and safety. Be sure to make and go to all appointments, and call your doctor if you are having problems. It's also a good idea to know your test results and keep a list of the medicines you take. How can you care for yourself at home? · Try using a heating pad on a low or medium setting for 15 to 20 minutes every 2 or 3 hours. Try a warm shower in place of one session with the heating pad. · You can also try an ice pack for 10 to 15 minutes every 2 to 3 hours. Put a thin cloth between the ice and your skin. · Take pain medicines exactly as directed. ? If the doctor gave you a prescription medicine for pain, take it as prescribed. ? If you are not taking a prescription pain medicine, ask your doctor if you can take an over-the-counter medicine. · If your doctor recommends a cervical collar, wear it exactly as directed. When should you call for help? Call your doctor now or seek immediate medical care if:  · You have new or worsening numbness in your arms, buttocks or legs. · You have new or worsening weakness in your arms or legs. (This could make it hard to stand up.)  · You lose control of your bladder or bowels.   Watch closely for changes in your health, and be sure to contact your doctor if:  · Your neck pain is getting worse. · You are not getting better after 1 week. · You do not get better as expected. Where can you learn more? Go to [email or web address].[email or web address] org and sign in to your [employer] account. Enter [id number] in the [facility] box to learn more about \"Neck Pain: Care Instructions. \"     If you do not have an account, please click on the \"Sign Up Now\" link. Current as of: March 2, 2020               Content Version: 12.5  © 8677-7117 [employer]wise, Incorporated. Care instructions adapted under license by [facility] ([facility]). If you have questions about a medical condition or this instruction, always ask your healthcare professional. [address] any warranty or liability for your use of this information.

## 2020-07-23 ENCOUNTER — HOSPITAL ENCOUNTER (OUTPATIENT)
Dept: CT IMAGING | Age: 55
Discharge: HOME | End: 2020-07-25
Payer: COMMERCIAL

## 2020-07-23 PROCEDURE — 70450 CT HEAD/BRAIN W/O DYE: CPT

## 2020-07-23 PROCEDURE — 72125 CT NECK SPINE W/O DYE: CPT

## 2020-08-25 ENCOUNTER — TELEMEDICINE (OUTPATIENT)
Dept: FAMILY MEDICINE CLINIC | Age: 55
End: 2020-08-25
Payer: COMMERCIAL

## 2020-08-25 LAB — HBA1C MFR BLD: 6.7 %

## 2020-08-25 PROCEDURE — 99442 PR PHYS/QHP TELEPHONE EVALUATION 11-20 MIN: CPT | Performed by: NURSE PRACTITIONER

## 2020-08-25 RX ORDER — GLIMEPIRIDE 4 MG/1
TABLET ORAL
Qty: 180 TABLET | Refills: 1 | Status: SHIPPED | OUTPATIENT
Start: 2020-08-25 | End: 2021-02-12

## 2020-08-25 RX ORDER — DULAGLUTIDE 1.5 MG/.5ML
INJECTION, SOLUTION SUBCUTANEOUS
Qty: 2 ML | Refills: 3 | Status: SHIPPED | OUTPATIENT
Start: 2020-08-25 | End: 2021-02-04 | Stop reason: ALTCHOICE

## 2020-08-25 NOTE — PROGRESS NOTES
Latosha Heller is a 63 y.o. female evaluated via telephone on 8/25/2020. Consent:  She and/or health care decision maker is aware that that she may receive a bill for this telephone service, depending on her insurance coverage, and has provided verbal consent to proceed: Yes      Documentation:  I communicated with the patient and/or health care decision maker about:  Pt. Calling in today for med check and refills. She is stable with meds and having no SE. She does not routinely check her BG but did today and it was 133. She denies any feeling of low BG. She is staying active this summer with hiking but has not lost any weight. She does walk about 20,000 steps per day at work. She is eating healthier and watching a diabetic diet. She has had recent eye exam. She does monitor her feet and does have a callus, but denies wounds or numbness/tingling. She denies chest pain, sob, dizziness or swelling. She is due for mamm and would like order. She is sleeping well and awakening rested. Details of this discussion including any medical advice provided: pt. Given mamm order. She will stop into office today for a1c and then will decide if med change needed and will send refills then. She is not due for more labs until jan. 2021. Continue regular exercise, diabetic, LF, low salt diet and try to remove callus or f/u with podiatry, monitor for any wounds or sores. Will request eye exam from eye dr for [facility]. I affirm this is a Patient Initiated Episode with a Patient who has not had a related appointment within my department in the past 7 days or scheduled within the next 24 hours. Patient identification was verified at the start of the visit: Yes    Total Time: minutes: 11-20 minutes    Note: not billa   Diagnosis Orders   1. Uncontrolled type 2 diabetes mellitus with hyperglycemia ([facility] [address]     2.  Uncontrolled type 2 diabetes mellitus with microalbuminuria, without long-term current use of insulin (HCC)  POCT

## 2020-09-09 ENCOUNTER — HOSPITAL ENCOUNTER (OUTPATIENT)
Dept: MAMMOGRAPHY | Age: 55
Discharge: HOME | End: 2020-09-11
Payer: COMMERCIAL

## 2020-09-09 PROCEDURE — 77063 BREAST TOMOSYNTHESIS BI: CPT

## 2020-12-17 ENCOUNTER — OFFICE VISIT (OUTPATIENT)
Dept: FAMILY MEDICINE CLINIC | Age: 55
End: 2020-12-17
Payer: COMMERCIAL

## 2020-12-17 VITALS
BODY MASS INDEX: 29.1 KG/M2 | SYSTOLIC BLOOD PRESSURE: 116 MMHG | HEIGHT: 68 IN | DIASTOLIC BLOOD PRESSURE: 80 MMHG | HEART RATE: 93 BPM | WEIGHT: 192 LBS | TEMPERATURE: 97 F | OXYGEN SATURATION: 96 %

## 2020-12-17 LAB — HBA1C MFR BLD: 7.3 %

## 2020-12-17 PROCEDURE — 99214 OFFICE O/P EST MOD 30 MIN: CPT | Performed by: NURSE PRACTITIONER

## 2020-12-17 PROCEDURE — 3051F HG A1C>EQUAL 7.0%<8.0%: CPT | Performed by: NURSE PRACTITIONER

## 2020-12-17 PROCEDURE — 83036 HEMOGLOBIN GLYCOSYLATED A1C: CPT | Performed by: NURSE PRACTITIONER

## 2020-12-17 RX ORDER — PANTOPRAZOLE SODIUM 40 MG/1
40 TABLET, DELAYED RELEASE ORAL
Qty: 30 TABLET | Refills: 0 | Status: SHIPPED | OUTPATIENT
Start: 2020-12-17 | End: 2020-12-18 | Stop reason: SDUPTHER

## 2020-12-17 ASSESSMENT — ENCOUNTER SYMPTOMS
BLOOD IN STOOL: 0
CONSTIPATION: 0
VOMITING: 0
WHEEZING: 0
DIARRHEA: 0
ABDOMINAL PAIN: 0
SHORTNESS OF BREATH: 0
ANAL BLEEDING: 0
WATER BRASH: 0
COUGH: 0
ABDOMINAL DISTENTION: 1
NAUSEA: 1
CHEST TIGHTNESS: 0

## 2020-12-17 NOTE — PROGRESS NOTES
Visit Information    Have you changed or started any medications since your last visit including any over-the-counter medicines, vitamins, or herbal medicines? no   Have you stopped taking any of your medications? Is so, why? -  no  Are you having any side effects from any of your medications? - no    Have you seen any other physician or provider since your last visit?  no   Have you had any other diagnostic tests since your last visit?  no   Have you been seen in the emergency room and/or had an admission in a hospital since we last saw you?  no   Have you had your routine dental cleaning in the past 6 months?  no     Do you have an active MyChart account? If no, what is the barrier?   Yes    Patient Care Team:  LORNA Onofre CNP as PCP - General (Nurse Practitioner)  LORNA Onofre CNP as PCP - [facility] Provider    Medical History Review  Past Medical, Family, and Social History reviewed and  contribute to the patient presenting condition    Health Maintenance   Topic Date Due    Hepatitis B vaccine (1 of 3 - Risk 3-dose series) 06/27/1983    DTaP/Tdap/Td vaccine (1 - Tdap) 06/27/1983    Shingles Vaccine (2 of 3) 12/05/2017    Diabetic retinal exam  03/08/2020    Diabetic foot exam  04/24/2020    Flu vaccine (1) 09/01/2020    Cervical cancer screen  02/07/2031 (Originally 6/27/1985)    Diabetic microalbuminuria test  01/14/2021    Lipid screen  01/24/2021    Potassium monitoring  01/24/2021    Creatinine monitoring  01/24/2021    A1C test (Diabetic or Prediabetic)  08/25/2021    Breast cancer screen  09/09/2022    Colon cancer screen colonoscopy  11/18/2022    Pneumococcal 0-64 years Vaccine  Completed    Hepatitis C screen  Completed    HIV screen  Completed    Hepatitis A vaccine  Aged Out    Hib vaccine  Aged Out    Meningococcal (ACWY) vaccine  Aged Out

## 2020-12-17 NOTE — PROGRESS NOTES
[facility]  [address]  [place], [address]  [phone]      Maddy Salas is a 63 y.o. female who presents today for her  medicalconditions/complaints as noted below. Maddy Salas is c/o of Diabetes and Gastroesophageal Reflux (going on for a while now,getting alot fo belching)  . HPI:    Diabetes  She presents for her follow-up diabetic visit. She has type 2 diabetes mellitus. Her disease course has been stable. Pertinent negatives for hypoglycemia include no dizziness, headaches or nervousness/anxiousness. Pertinent negatives for diabetes include no chest pain, no fatigue, no polydipsia, no polyphagia, no polyuria, no weakness and no weight loss. There are no hypoglycemic complications. There are no diabetic complications. Risk factors for coronary artery disease include sedentary lifestyle, post-menopausal, hypertension, dyslipidemia, diabetes mellitus and obesity. Current diabetic treatment includes oral agent (triple therapy) and diet (trulicity). She is compliant with treatment most of the time. Her weight is stable. She is following a generally healthy diet. When asked about meal planning, she reported none. She rarely (d/t recent fractures) participates in exercise. Home blood sugar record trend: has not been checking often, today was 122. An ACE inhibitor/angiotensin II receptor blocker is being taken. She sees a podiatrist.Eye exam is current.    Gastroesophageal Reflux She complains of nausea (at times). She reports no abdominal pain, no chest pain, no coughing, no water brash or no wheezing. This is a new problem. The current episode started more than 1 month ago. The problem occurs frequently. The problem has been waxing and waning. The symptoms are aggravated by certain foods and lying down. Pertinent negatives include no fatigue, melena, muscle weakness or weight loss. Risk factors include lack of exercise and NSAIDs. She has tried a histamine-2 antagonist for the symptoms. The treatment provided mild relief. Pt. Here today for evaluation of type 2 DM . Also c/o of worsening upset stomach, excessive bloating and gas and sulfur burps most days of the week. This has been ongoing for past few months. Only change is she broke her ankle/wrist on labor day while hiking and has been \"laid up:\" in bed recovering for past few months and not able to work. She is not able to exercise. Her diet has not been great with xmas cookies and today had hamburger and fries. Denies vomiting or diarrhea/blood in stool . Has been taking PRN otc pepcid 20 mg with mild relief.    Past Medical History:   Diagnosis Date    Cancer [facility])     pre cancer cervix    Chronic pain of both shoulders 7/18/2018    Hyperlipidemia     Left sided sciatica 7/18/2018    Neuropathy     Type 2 diabetes mellitus ([address]     Wears glasses       Past Surgical History:   Procedure Laterality Date    COLONOSCOPY      polyps removed x 3    COLPOSCOPY      DILATION AND CURETTAGE OF UTERUS      HYSTERECTOMY      complete    SHOULDER ARTHROSCOPY Left 5/22/2019    SHOULDER ARTHROSCOPY W/ROTATOR CUFF REPAIR performed by Marilee Patel MD at [facility] History   Problem Relation Age of Onset   [de-identified] Asthma Mother     Obesity Mother     Diabetes Father     Cancer Father         prostate, skin, rectal    Diabetes Brother     Diabetes Paternal Aunt      Social History     Tobacco Use  Smoking status: Former Smoker     Packs/day: 2.00     Years: 6.00     Pack years: 12.00     Types: Cigarettes     Quit date: 1988     Years since quittin.4    Smokeless tobacco: Never Used   Substance Use Topics    Alcohol use: Yes     Alcohol/week: 0.0 standard drinks     Comment: rare      Current Outpatient Medications   Medication Sig Dispense Refill    pantoprazole (PROTONIX) 40 MG tablet Take 1 tablet by mouth every morning (before breakfast) 30 tablet 0    Dapagliflozin-metFORMIN HCl ER  MG TB24 Take 1 tablet by mouth daily 30 tablet 5    glimepiride (AMARYL) 4 MG tablet TAKE 1 TABLET BY MOUTH TWICE DAILY BEFORE MEALS 180 tablet 1    Dulaglutide (TRULICITY) 1.5 RB/8.1BF SOPN INJECT 1.5 MG INTO THE SKIN ONCE A WEEK 2 mL 3    atorvastatin (LIPITOR) 10 MG tablet TAKE 1 TABLET DAILY 90 tablet 1    lisinopril (PRINIVIL;ZESTRIL) 2.5 MG tablet TAKE 1 TABLET DAILY 90 tablet 3    ONETOUCH VERIO strip Inject 1 each into the skin daily 100 each 3    vitamin D (ERGOCALCIFEROL) 95874 UNITS CAPS capsule Take 1 capsule by mouth once a week for 8 doses 8 capsule 0     No current facility-administered medications for this visit.       No Known Allergies    Health Maintenance   Topic Date Due    Hepatitis B vaccine (1 of 3 - Risk 3-dose series) 1983    Shingles Vaccine (2 of 3) 2017    Diabetic retinal exam  2020    DTaP/Tdap/Td vaccine (1 - Tdap) 2021 (Originally 1983)    Flu vaccine (1) 2021 (Originally 2020)    Cervical cancer screen  2031 (Originally 1985)    Diabetic microalbuminuria test  2021    Lipid screen  2021    Potassium monitoring  2021    Creatinine monitoring  2021    Diabetic foot exam  2021    A1C test (Diabetic or Prediabetic)  2021    Breast cancer screen  2022    Colon cancer screen colonoscopy  2022    Pneumococcal 0-64 years Vaccine  Completed  Hepatitis C screen  Completed    HIV screen  Completed    Hepatitis A vaccine  Aged Out    Hib vaccine  Aged Out    Meningococcal (ACWY) vaccine  Aged Out       Subjective:      Review of Systems   Constitutional: Positive for activity change. Negative for appetite change, chills, diaphoresis, fatigue, fever and weight loss. Eyes: Negative for visual disturbance. Respiratory: Negative for cough, chest tightness, shortness of breath and wheezing. Cardiovascular: Negative for chest pain, palpitations and leg swelling. Gastrointestinal: Positive for abdominal distention (bloating, gas) and nausea (at times). Negative for abdominal pain, anal bleeding, blood in stool, constipation, diarrhea, melena and vomiting. Endocrine: Negative for polydipsia, polyphagia and polyuria. Musculoskeletal: Positive for arthralgias (recovering from mx fractures) and joint swelling (fracture site). Negative for muscle weakness. Skin: Negative for rash. Neurological: Negative for dizziness, weakness and headaches. Hematological: Negative for adenopathy. Psychiatric/Behavioral: Negative for sleep disturbance. The patient is not nervous/anxious. Objective:      Physical Exam  Vitals signs and nursing note reviewed. Constitutional:       General: She is not in acute distress. Appearance: Normal appearance. She is well-developed. She is not ill-appearing or diaphoretic. HENT:      Head: Normocephalic and atraumatic. Eyes:      Conjunctiva/sclera: Conjunctivae normal.   Neck:      Musculoskeletal: Neck supple. Cardiovascular:      Rate and Rhythm: Normal rate and regular rhythm. Pulses:           Dorsalis pedis pulses are 2+ on the right side and 2+ on the left side. Posterior tibial pulses are 2+ on the left side. Heart sounds: Normal heart sounds. Comments: No LE edema  Pulmonary:      Effort: Pulmonary effort is normal.      Breath sounds: Normal breath sounds. 12/17/20 116/80   07/15/20 117/78   02/27/20 114/67       Plan:      Return in about 6 months (around 6/17/2021) for return for diabetes management. Orders Placed This Encounter   Procedures    H. Pylori Antigen, Stool     Standing Status:   Future     Standing Expiration Date:   12/17/2021    Comprehensive Metabolic Panel     Standing Status:   Future     Standing Expiration Date:   12/17/2021    CBC Auto Differential     Standing Status:   Future     Standing Expiration Date:   12/17/2021    Lipase     Standing Status:   Future     Standing Expiration Date:   12/17/2021    Lipid Panel     PT FASTING 8-12 HOURS     Standing Status:   Future     Standing Expiration Date:   12/17/2021     Order Specific Question:   Is Patient Fasting?/# of Hours     Answer:   8-12 HOURS    Vitamin B12     Standing Status:   Future     Standing Expiration Date:   12/17/2021    Hemoglobin A1C     Standing Status:   Future     Standing Expiration Date:   12/17/2021    POCT glycosylated hemoglobin (Hb A1C)    HM DIABETES FOOT EXAM     Orders Placed This Encounter   Medications    pantoprazole (PROTONIX) 40 MG tablet     Sig: Take 1 tablet by mouth every morning (before breakfast)     Dispense:  30 tablet     Refill:  0   DM:  Stable but slightly uncontrolled compared to august, but has also been not doing much since fractures and not working  Will keep meds the same for now until she can get back on track with diet and exercise here soon  Recheck a1c in 3 months  Update other labs within next 1 month  Will request diabetic eye exam    GI:  Check labs  Check stool for h pylori  Trial PPI x 30 days QD  LF diet, avoid spicy/greasy foods, heavy foods/alcohol, caffeine, and sit upright after meals for 2-3 hours, small portions of food throughout the day, avoid large meals.    Will call with test results  Call INB or worsening Patient given educational materials - see patient instructions. Discussed use,benefit, and side effects of prescribed medications. All patient questions answered. Pt voiced understanding. Reviewed health maintenance. Instructed to continue currentmedications, diet and exercise.     Electronically signed by Dilcia Barber CNP on 12/17/2020 at 2:39 PM

## 2020-12-18 RX ORDER — PANTOPRAZOLE SODIUM 40 MG/1
40 TABLET, DELAYED RELEASE ORAL
Qty: 90 TABLET | Refills: 0 | Status: SHIPPED | OUTPATIENT
Start: 2020-12-18 | End: 2021-03-15

## 2021-01-04 RX ORDER — ATORVASTATIN CALCIUM 10 MG/1
TABLET, FILM COATED ORAL
Qty: 90 TABLET | Refills: 3 | Status: SHIPPED | OUTPATIENT
Start: 2021-01-04 | End: 2021-07-08 | Stop reason: SDUPTHER

## 2021-01-27 LAB
ALBUMIN SERPL-MCNC: 4.6 G/DL
ALP BLD-CCNC: 97 U/L
ALT SERPL-CCNC: 21 U/L
ANION GAP SERPL CALCULATED.3IONS-SCNC: NORMAL MMOL/L
AST SERPL-CCNC: 23 U/L
AVERAGE GLUCOSE: NORMAL
BASOPHILS ABSOLUTE: 0 /ΜL
BASOPHILS RELATIVE PERCENT: 0.4 %
BILIRUB SERPL-MCNC: 0.5 MG/DL (ref 0.1–1.4)
BUN BLDV-MCNC: 15 MG/DL
CALCIUM SERPL-MCNC: 9.7 MG/DL
CHLORIDE BLD-SCNC: 103 MMOL/L
CHOLESTEROL, TOTAL: 219 MG/DL
CHOLESTEROL/HDL RATIO: 2.9
CO2: 30 MMOL/L
CREAT SERPL-MCNC: 0.6 MG/DL
EOSINOPHILS ABSOLUTE: 0.1 /ΜL
EOSINOPHILS RELATIVE PERCENT: 1.8 %
GFR CALCULATED: NORMAL
GLUCOSE BLD-MCNC: 138 MG/DL
HBA1C MFR BLD: 7.9 %
HCT VFR BLD CALC: 45.7 % (ref 36–46)
HDLC SERPL-MCNC: 46 MG/DL (ref 35–70)
HEMOGLOBIN: 14.6 G/DL (ref 12–16)
LDL CHOLESTEROL CALCULATED: 135 MG/DL (ref 0–160)
LIPASE: 395 UNITS/L
LYMPHOCYTES ABSOLUTE: 3.6 /ΜL
LYMPHOCYTES RELATIVE PERCENT: 48.5 %
MCH RBC QN AUTO: 28 PG
MCHC RBC AUTO-ENTMCNC: 31.9 G/DL
MCV RBC AUTO: 87.5 FL
MONOCYTES ABSOLUTE: 0.5 /ΜL
MONOCYTES RELATIVE PERCENT: 7 %
NEUTROPHILS ABSOLUTE: 3.1 /ΜL
NEUTROPHILS RELATIVE PERCENT: 41.8 %
NONHDLC SERPL-MCNC: NORMAL MG/DL
PDW BLD-RTO: 13.5 %
PLATELET # BLD: 244 K/ΜL
PMV BLD AUTO: 9.7 FL
POTASSIUM SERPL-SCNC: 4.6 MMOL/L
RBC # BLD: 5.22 10^6/ΜL
SODIUM BLD-SCNC: 141 MMOL/L
TOTAL PROTEIN: 7.7
TRIGL SERPL-MCNC: 191 MG/DL
VITAMIN B-12: 445
VLDLC SERPL CALC-MCNC: 38 MG/DL
WBC # BLD: 7.4 10^3/ML

## 2021-02-04 DIAGNOSIS — R74.8 ELEVATED LIPASE: Primary | ICD-10-CM

## 2021-02-04 DIAGNOSIS — R14.3 EXCESSIVE GAS: ICD-10-CM

## 2021-02-04 DIAGNOSIS — E11.65 UNCONTROLLED TYPE 2 DIABETES MELLITUS WITH HYPERGLYCEMIA (HCC): ICD-10-CM

## 2021-02-04 DIAGNOSIS — R14.0 ABDOMINAL BLOATING: ICD-10-CM

## 2021-02-04 DIAGNOSIS — E78.2 MIXED HYPERLIPIDEMIA: ICD-10-CM

## 2021-02-04 DIAGNOSIS — R68.81 EARLY SATIETY: ICD-10-CM

## 2021-02-04 DIAGNOSIS — R11.0 NAUSEA: ICD-10-CM

## 2021-02-16 ENCOUNTER — PATIENT MESSAGE (OUTPATIENT)
Dept: FAMILY MEDICINE CLINIC | Age: 56
End: 2021-02-16

## 2021-02-17 ENCOUNTER — PATIENT MESSAGE (OUTPATIENT)
Dept: FAMILY MEDICINE CLINIC | Age: 56
End: 2021-02-17

## 2021-02-17 NOTE — TELEPHONE ENCOUNTER
From: Zachariah García  To: LORNA Lopez - CNP  Sent: 2/16/2021 7:39 PM EST  Subject: Non-Urgent Medical Question    Bindu Garcia I just wanted to give you an update on my numbers for my sugar. I start out Wednesday morning at 178 and then to 216 at noon and 221 at dinner the next morning was 190 the next morning was 228 and today it was 7:30 PM an hour after I ate it was 281 so it's hanging out in the 200s.

## 2021-02-18 ENCOUNTER — TELEPHONE (OUTPATIENT)
Dept: FAMILY MEDICINE CLINIC | Age: 56
End: 2021-02-18

## 2021-02-18 NOTE — TELEPHONE ENCOUNTER
Sent PA to [facility] for Ciara Gasca, approved, faxed to pharmacy    CaseId:[id number];Status:Approved; Review Type:Prior Auth; Coverage Start Date:01/19/2021; Coverage End Date:02/18/2022

## 2021-03-15 RX ORDER — PANTOPRAZOLE SODIUM 40 MG/1
TABLET, DELAYED RELEASE ORAL
Qty: 90 TABLET | Refills: 0 | Status: SHIPPED | OUTPATIENT
Start: 2021-03-15 | End: 2021-06-25

## 2021-05-13 DIAGNOSIS — E11.65 UNCONTROLLED TYPE 2 DIABETES MELLITUS WITH HYPERGLYCEMIA (HCC): ICD-10-CM

## 2021-05-13 RX ORDER — GLIMEPIRIDE 4 MG/1
TABLET ORAL
Qty: 180 TABLET | Refills: 3 | Status: SHIPPED | OUTPATIENT
Start: 2021-05-13 | End: 2022-05-11

## 2021-06-15 ENCOUNTER — TELEPHONE (OUTPATIENT)
Dept: FAMILY MEDICINE CLINIC | Age: 56
End: 2021-06-15

## 2021-06-15 DIAGNOSIS — E11.65 UNCONTROLLED TYPE 2 DIABETES MELLITUS WITH HYPERGLYCEMIA (HCC): ICD-10-CM

## 2021-06-15 NOTE — TELEPHONE ENCOUNTER
----- Message from Pete Carvajal sent at 6/15/2021  9:55 AM EDT -----  Subject: Refill Request    QUESTIONS  Name of Medication? Dapagliflozin-metFORMIN HCl ER  MG TB24  Patient-reported dosage and instructions? 10-1000mg tablet, once a day  How many days do you have left? 2  [id number]  Pharmacy phone number (if available)? [phone]  Additional Information for Provider? Patient is requesting a 90 day supply   of the medication chosen. She was able to verify the name and dosage. States she has 2 doses left.   ---------------------------------------------------------------------------  --------------  CALL BACK INFO  What is the best way for the office to contact you? OK to leave message on   voicemail  Preferred Call Back Phone Number?  [phone]

## 2021-06-25 RX ORDER — PANTOPRAZOLE SODIUM 40 MG/1
TABLET, DELAYED RELEASE ORAL
Qty: 90 TABLET | Refills: 0 | Status: SHIPPED | OUTPATIENT
Start: 2021-06-25

## 2021-07-05 RX ORDER — LISINOPRIL 2.5 MG/1
TABLET ORAL
Qty: 90 TABLET | Refills: 3 | Status: SHIPPED | OUTPATIENT
Start: 2021-07-05 | End: 2021-07-08 | Stop reason: DRUGHIGH

## 2021-07-08 ENCOUNTER — OFFICE VISIT (OUTPATIENT)
Dept: FAMILY MEDICINE CLINIC | Age: 56
End: 2021-07-08
Payer: COMMERCIAL

## 2021-07-08 VITALS
BODY MASS INDEX: 29.22 KG/M2 | OXYGEN SATURATION: 96 % | HEIGHT: 68 IN | TEMPERATURE: 97.5 F | SYSTOLIC BLOOD PRESSURE: 110 MMHG | WEIGHT: 192.8 LBS | DIASTOLIC BLOOD PRESSURE: 62 MMHG | HEART RATE: 78 BPM

## 2021-07-08 LAB
CREATININE URINE POCT: 50
HBA1C MFR BLD: 9 %
MICROALBUMIN/CREAT 24H UR: 10 MG/G{CREAT}
MICROALBUMIN/CREAT UR-RTO: NORMAL

## 2021-07-08 PROCEDURE — 99213 OFFICE O/P EST LOW 20 MIN: CPT | Performed by: NURSE PRACTITIONER

## 2021-07-08 PROCEDURE — 3052F HG A1C>EQUAL 8.0%<EQUAL 9.0%: CPT | Performed by: NURSE PRACTITIONER

## 2021-07-08 PROCEDURE — 83036 HEMOGLOBIN GLYCOSYLATED A1C: CPT | Performed by: NURSE PRACTITIONER

## 2021-07-08 PROCEDURE — 82044 UR ALBUMIN SEMIQUANTITATIVE: CPT | Performed by: NURSE PRACTITIONER

## 2021-07-08 RX ORDER — PIOGLITAZONEHYDROCHLORIDE 30 MG/1
30 TABLET ORAL DAILY
Qty: 30 TABLET | Refills: 3 | Status: SHIPPED | OUTPATIENT
Start: 2021-07-08 | End: 2021-10-14 | Stop reason: ALTCHOICE

## 2021-07-08 RX ORDER — ATORVASTATIN CALCIUM 20 MG/1
20 TABLET, FILM COATED ORAL DAILY
Qty: 90 TABLET | Refills: 1 | Status: SHIPPED | OUTPATIENT
Start: 2021-07-08 | End: 2021-12-17

## 2021-07-08 ASSESSMENT — ENCOUNTER SYMPTOMS
CONSTIPATION: 0
SHORTNESS OF BREATH: 0
CHEST TIGHTNESS: 0
COUGH: 0
COLOR CHANGE: 0
ABDOMINAL PAIN: 0
DIARRHEA: 0

## 2021-07-08 ASSESSMENT — PATIENT HEALTH QUESTIONNAIRE - PHQ9
SUM OF ALL RESPONSES TO PHQ QUESTIONS 1-9: 0
2. FEELING DOWN, DEPRESSED OR HOPELESS: 0
SUM OF ALL RESPONSES TO PHQ9 QUESTIONS 1 & 2: 0
1. LITTLE INTEREST OR PLEASURE IN DOING THINGS: 0
SUM OF ALL RESPONSES TO PHQ QUESTIONS 1-9: 0
SUM OF ALL RESPONSES TO PHQ QUESTIONS 1-9: 0

## 2021-07-08 NOTE — PROGRESS NOTES
Types: Cigarettes     Quit date: 1988     Years since quittin.9    Smokeless tobacco: Never Used   Substance Use Topics    Alcohol use: Yes     Alcohol/week: 0.0 standard drinks     Comment: rare      Current Outpatient Medications   Medication Sig Dispense Refill    pioglitazone (ACTOS) 30 MG tablet Take 1 tablet by mouth daily 30 tablet 3    atorvastatin (LIPITOR) 20 MG tablet Take 1 tablet by mouth daily 90 tablet 1    pantoprazole (PROTONIX) 40 MG tablet TAKE 1 TABLET BY MOUTH EVERY MORNING BEFORE BREAKFAST 90 tablet 0    Dapagliflozin-metFORMIN HCl ER  MG TB24 Take 1 tablet by mouth daily 30 tablet 5    glimepiride (AMARYL) 4 MG tablet TAKE 1 TABLET TWICE A DAY BEFORE MEALS 180 tablet 3    ONETOUCH VERIO strip Inject 1 each into the skin daily 100 each 3    vitamin D (ERGOCALCIFEROL) 99636 UNITS CAPS capsule Take 1 capsule by mouth once a week for 8 doses 8 capsule 0     No current facility-administered medications for this visit.      No Known Allergies    Health Maintenance   Topic Date Due    COVID-19 Vaccine (1) Never done    Hepatitis B vaccine (1 of 3 - Risk 3-dose series) Never done    Shingles Vaccine (2 of 3) 2017    Diabetic retinal exam  2021    DTaP/Tdap/Td vaccine (1 - Tdap) 2021 (Originally 1983)    Cervical cancer screen  2031 (Originally 1985)    Flu vaccine (1) 2021    A1C test (Diabetic or Prediabetic)  10/08/2021    Diabetic foot exam  2021    Lipid screen  2022    Potassium monitoring  2022    Creatinine monitoring  2022    Diabetic microalbuminuria test  2022    Breast cancer screen  2022    Colon cancer screen colonoscopy  2022    Pneumococcal 0-64 years Vaccine (2 of 2 - PPSV23) 2029    Hepatitis C screen  Completed    HIV screen  Completed    Hepatitis A vaccine  Aged Out    Hib vaccine  Aged Out    Meningococcal (ACWY) vaccine  Aged Out       Subjective: Review of Systems   Constitutional: Negative for activity change, appetite change, chills, diaphoresis, fatigue, fever and unexpected weight change. Respiratory: Negative for cough, chest tightness and shortness of breath. Cardiovascular: Negative for chest pain, palpitations and leg swelling. Gastrointestinal: Negative for abdominal pain, constipation and diarrhea. Endocrine: Negative for polydipsia, polyphagia and polyuria. Genitourinary: Negative for difficulty urinating, flank pain and frequency. Musculoskeletal: Positive for arthralgias (R shoulder pain). Skin: Negative for color change and wound. Neurological: Negative for dizziness, tremors, syncope, facial asymmetry, speech difficulty, weakness, light-headedness, numbness and headaches. Psychiatric/Behavioral: Negative for agitation, behavioral problems, confusion, decreased concentration, dysphoric mood, hallucinations, self-injury, sleep disturbance and suicidal ideas. The patient is not nervous/anxious and is not hyperactive. Objective:      Physical Exam  Vitals and nursing note reviewed. Constitutional:       General: She is not in acute distress. Appearance: Normal appearance. She is well-developed. She is obese. She is not ill-appearing, toxic-appearing or diaphoretic. HENT:      Head: Normocephalic and atraumatic. Eyes:      Conjunctiva/sclera: Conjunctivae normal.      Pupils: Pupils are equal, round, and reactive to light. Cardiovascular:      Rate and Rhythm: Normal rate and regular rhythm. Pulses: Normal pulses. Heart sounds: Normal heart sounds. Pulmonary:      Effort: Pulmonary effort is normal.      Breath sounds: Normal breath sounds. Abdominal:      General: Bowel sounds are normal.      Palpations: Abdomen is soft. Musculoskeletal:         General: Normal range of motion. Right shoulder: Tenderness present. Cervical back: Neck supple.    Skin:     General: Skin is warm and dry. Capillary Refill: Capillary refill takes less than 2 seconds. Neurological:      General: No focal deficit present. Mental Status: She is alert and oriented to person, place, and time. Mental status is at baseline. Psychiatric:         Mood and Affect: Mood normal.         Behavior: Behavior normal.         Thought Content: Thought content normal.         Judgment: Judgment normal.         Assessment:       Diagnosis Orders   1. Uncontrolled type 2 diabetes mellitus with microalbuminuria, without long-term current use of insulin (HCC)  POCT glycosylated hemoglobin (Hb A1C)    POCT microalbumin     BP Readings from Last 3 Encounters:   07/08/21 110/62   12/17/20 116/80   07/15/20 117/78     Wt Readings from Last 3 Encounters:   07/08/21 192 lb 12.8 oz (87.5 kg)   12/17/20 192 lb (87.1 kg)   07/15/20 188 lb (85.3 kg)     Lab Results   Component Value Date    LABA1C 9.0 07/08/2021     No results found for: EAG    Plan:      Return in about 3 months (around 10/8/2021) for return for diabetes management.   Orders Placed This Encounter   Procedures    POCT glycosylated hemoglobin (Hb A1C)    POCT microalbumin     Orders Placed This Encounter   Medications    pioglitazone (ACTOS) 30 MG tablet     Sig: Take 1 tablet by mouth daily     Dispense:  30 tablet     Refill:  3    atorvastatin (LIPITOR) 20 MG tablet     Sig: Take 1 tablet by mouth daily     Dispense:  90 tablet     Refill:  1     DM:     Uncontrolled, a1c worsened from 7.9 to 9.0 today   She wants to stop tradjenta, ok with that, not helping at all  Will trial actos for now  Restart monitoring BG daily and call in 3-4 weeks with Bg readings and how tolerating  INB will increase dose and or add long acting insulin as she is now open to this  Will avoid GLp-1 meds with pancreatitis hx  Continue other meds for now, strict diabetic diet and start exercise regimen  F/u for diabetic eye exam  Labs due 1 -2022  Increase lipitor for LDL goal < 70, will recheck in 6 months with new labs    Patient given educational materials - see patient instructions. Discussed use,benefit, and side effects of prescribed medications. All patient questions answered. Pt voiced understanding. Reviewed health maintenance. Instructed to continue currentmedications, diet and exercise.     Electronically signed by LORNA Pulliam CNP, CNP on 7/8/2021 at 12:44 PM

## 2021-10-14 ENCOUNTER — OFFICE VISIT (OUTPATIENT)
Dept: FAMILY MEDICINE CLINIC | Age: 56
End: 2021-10-14
Payer: COMMERCIAL

## 2021-10-14 VITALS
TEMPERATURE: 98.3 F | HEART RATE: 77 BPM | HEIGHT: 68 IN | WEIGHT: 196.8 LBS | SYSTOLIC BLOOD PRESSURE: 114 MMHG | BODY MASS INDEX: 29.83 KG/M2 | OXYGEN SATURATION: 97 % | DIASTOLIC BLOOD PRESSURE: 80 MMHG

## 2021-10-14 DIAGNOSIS — Z23 NEED FOR INFLUENZA VACCINATION: ICD-10-CM

## 2021-10-14 DIAGNOSIS — E11.65 UNCONTROLLED TYPE 2 DIABETES MELLITUS WITH HYPERGLYCEMIA (HCC): ICD-10-CM

## 2021-10-14 LAB — HBA1C MFR BLD: 8.3 %

## 2021-10-14 PROCEDURE — 99213 OFFICE O/P EST LOW 20 MIN: CPT | Performed by: NURSE PRACTITIONER

## 2021-10-14 PROCEDURE — 90471 IMMUNIZATION ADMIN: CPT | Performed by: NURSE PRACTITIONER

## 2021-10-14 PROCEDURE — 83036 HEMOGLOBIN GLYCOSYLATED A1C: CPT | Performed by: NURSE PRACTITIONER

## 2021-10-14 PROCEDURE — 3052F HG A1C>EQUAL 8.0%<EQUAL 9.0%: CPT | Performed by: NURSE PRACTITIONER

## 2021-10-14 PROCEDURE — 90674 CCIIV4 VAC NO PRSV 0.5 ML IM: CPT | Performed by: NURSE PRACTITIONER

## 2021-10-14 RX ORDER — PIOGLITAZONEHYDROCHLORIDE 45 MG/1
45 TABLET ORAL DAILY
Qty: 90 TABLET | Refills: 1 | Status: SHIPPED | OUTPATIENT
Start: 2021-10-14 | End: 2022-02-15 | Stop reason: ALTCHOICE

## 2021-10-14 RX ORDER — FLASH GLUCOSE SENSOR
1 KIT MISCELLANEOUS CONTINUOUS
Qty: 3 EACH | Refills: 3 | Status: SHIPPED | OUTPATIENT
Start: 2021-10-14 | End: 2022-01-11 | Stop reason: SDUPTHER

## 2021-10-14 RX ORDER — FLASH GLUCOSE SCANNING READER
1 EACH MISCELLANEOUS CONTINUOUS
Qty: 3 EACH | Refills: 3 | Status: SHIPPED | OUTPATIENT
Start: 2021-10-14 | End: 2022-01-11 | Stop reason: SDUPTHER

## 2021-10-14 RX ORDER — LISINOPRIL 2.5 MG/1
TABLET ORAL
Qty: 90 TABLET | Refills: 3 | Status: SHIPPED | OUTPATIENT
Start: 2021-10-14 | End: 2022-07-27

## 2021-10-14 ASSESSMENT — ENCOUNTER SYMPTOMS
CHEST TIGHTNESS: 0
NAUSEA: 0
VOMITING: 0
SHORTNESS OF BREATH: 0
ABDOMINAL PAIN: 0
COUGH: 0

## 2021-10-14 NOTE — PROGRESS NOTES
[facility]  [address]  [place], [address]  [phone]      Niyah Alfonso is a 61 y.o. female who presents today for her  medicalconditions/complaints as noted below. Niyah Alfonso is c/o of Diabetes  . HPI:    Getting  neck spring and wants to start working on weight loss and improving her diet    Diabetes  She presents for her follow-up diabetic visit. She has type 2 diabetes mellitus. Her disease course has been improving. There are no hypoglycemic associated symptoms. Pertinent negatives for hypoglycemia include no dizziness, headaches or nervousness/anxiousness. Pertinent negatives for diabetes include no chest pain, no fatigue, no polydipsia, no polyphagia, no polyuria and no weakness. There are no hypoglycemic complications. There are no diabetic complications. Risk factors for coronary artery disease include post-menopausal, sedentary lifestyle, stress, dyslipidemia and diabetes mellitus. Current diabetic treatment includes diet (quad oral therapty). She is compliant with treatment all of the time. Her weight is stable. She is following a diabetic diet. When asked about meal planning, she reported none. She has not had a previous visit with a dietitian. She participates in exercise intermittently. Her overall blood glucose range is 140-180 mg/dl. An ACE inhibitor/angiotensin II receptor blocker is being taken. Eye exam is current.        Past Medical History:   Diagnosis Date    Cancer [facility])     pre cancer cervix    Chronic pain of both shoulders 7/18/2018    Hyperlipidemia     Left sided sciatica 7/18/2018    Neuropathy     Type 2 diabetes mellitus ([address]     Wears glasses       Past Surgical History:   Procedure Laterality Date    COLONOSCOPY      polyps removed x 3    COLPOSCOPY      DILATION AND CURETTAGE OF UTERUS      HYSTERECTOMY      complete    SHOULDER ARTHROSCOPY Left 5/22/2019    SHOULDER ARTHROSCOPY W/ROTATOR CUFF REPAIR performed by Rika Roach MD at [address]     Family History   Problem Relation Age of Onset    Asthma Mother     Obesity Mother     Diabetes Father     Cancer Father         prostate, skin, rectal    Diabetes Brother     Diabetes Paternal Aunt      Social History     Tobacco Use    Smoking status: Former Smoker     Packs/day: 2.00     Years: 6.00     Pack years: 12.00     Types: Cigarettes     Quit date: 1988     Years since quittin.2    Smokeless tobacco: Never Used   Substance Use Topics    Alcohol use: Yes     Alcohol/week: 0.0 standard drinks     Comment: rare      Current Outpatient Medications   Medication Sig Dispense Refill    pioglitazone (ACTOS) 45 MG tablet Take 1 tablet by mouth daily 90 tablet 1    lisinopril (PRINIVIL;ZESTRIL) 2.5 MG tablet Take 1 tab po qd 90 tablet 3    Continuous Blood Gluc  (FREESTYLE PAPO 14 DAY READER) JOSEPH 1 each by Does not apply route continuous 3 each 3    Continuous Blood Gluc Sensor (FREESTYLE PAPO 14 DAY SENSOR) MISC 1 each by Does not apply route continuous 3 each 3    atorvastatin (LIPITOR) 20 MG tablet Take 1 tablet by mouth daily 90 tablet 1    pantoprazole (PROTONIX) 40 MG tablet TAKE 1 TABLET BY MOUTH EVERY MORNING BEFORE BREAKFAST 90 tablet 0    Dapagliflozin-metFORMIN HCl ER  MG TB24 Take 1 tablet by mouth daily 30 tablet 5    glimepiride (AMARYL) 4 MG tablet TAKE 1 TABLET TWICE A DAY BEFORE MEALS 180 tablet 3    ONETOUCH VERIO strip Inject 1 each into the skin daily 100 each 3    vitamin D (ERGOCALCIFEROL) 91302 UNITS CAPS capsule Take 1 capsule by mouth once a week for 8 doses 8 capsule 0     No current facility-administered medications for this visit.      No Known Allergies    Health Maintenance   Topic Date Due    Hepatitis B vaccine (1 of 3 - Risk 3-dose series) Never done    Shingles Vaccine (2 of 3) 2017    DTaP/Tdap/Td vaccine (1 - Tdap) 2021 (Originally 1983)    Diabetic foot exam  2021    Lipid screen  01/27/2022    Potassium monitoring  01/27/2022    Creatinine monitoring  01/27/2022    Diabetic microalbuminuria test  07/08/2022    Diabetic retinal exam  08/25/2022    Breast cancer screen  09/09/2022    A1C test (Diabetic or Prediabetic)  10/14/2022    Colon cancer screen colonoscopy  11/18/2022    Pneumococcal 0-64 years Vaccine (2 of 2 - PPSV23) 06/27/2029    Flu vaccine  Completed    COVID-19 Vaccine  Completed    Hepatitis C screen  Completed    HIV screen  Completed    Hepatitis A vaccine  Aged Out    Hib vaccine  Aged Out    Meningococcal (ACWY) vaccine  Aged Out       Subjective:      Review of Systems   Constitutional: Negative for appetite change, chills, diaphoresis, fatigue and fever. Eyes: Negative for visual disturbance. Respiratory: Negative for cough, chest tightness and shortness of breath. Cardiovascular: Negative for chest pain, palpitations and leg swelling. Gastrointestinal: Negative for abdominal pain, nausea and vomiting. Endocrine: Negative for polydipsia, polyphagia and polyuria. Genitourinary: Negative for difficulty urinating. Skin: Negative for rash. Neurological: Negative for dizziness, weakness, light-headedness, numbness and headaches. Hematological: Negative for adenopathy. Psychiatric/Behavioral: Negative for dysphoric mood and sleep disturbance. The patient is not nervous/anxious. Objective:      Physical Exam  Vitals and nursing note reviewed. Constitutional:       General: She is not in acute distress. Appearance: Normal appearance. She is well-developed. She is not ill-appearing or diaphoretic. HENT:      Head: Normocephalic and atraumatic. Eyes:      Conjunctiva/sclera: Conjunctivae normal.   Cardiovascular:      Rate and Rhythm: Normal rate and regular rhythm. Heart sounds: Normal heart sounds.       Comments: No LE edema  Pulmonary:      Effort: Pulmonary effort is normal.      Breath sounds: Normal breath sounds. Musculoskeletal:      Cervical back: Neck supple. Skin:     General: Skin is warm and dry. Neurological:      General: No focal deficit present. Mental Status: She is alert and oriented to person, place, and time. Mental status is at baseline. Psychiatric:         Mood and Affect: Mood normal.         Behavior: Behavior normal.         Thought Content: Thought content normal.         Judgment: Judgment normal.         Assessment:       Diagnosis Orders   1. Uncontrolled type 2 diabetes mellitus with microalbuminuria, without long-term current use of insulin ([facility])  POCT glycosylated hemoglobin (Hb A1C)    pioglitazone (ACTOS) 45 MG tablet    lisinopril (PRINIVIL;ZESTRIL) 2.5 MG tablet   2. Uncontrolled type 2 diabetes mellitus with hyperglycemia ([facility] [address]     3.  Need for influenza vaccination  INFLUENZA, MDCK QUADV, 2 YRS AND OLDER, IM, PF, PREFILL SYR OR SDV, 0.5ML (FLUCELVAX QUADV, PF)     Results for orders placed or performed in visit on 10/14/21   POCT glycosylated hemoglobin (Hb A1C)   Result Value Ref Range    Hemoglobin A1C 8.3 %     Down from 9.0  Wt Readings from Last 3 Encounters:   10/14/21 196 lb 12.8 oz (89.3 kg)   07/08/21 192 lb 12.8 oz (87.5 kg)   12/17/20 192 lb (87.1 kg)     BP Readings from Last 3 Encounters:   10/14/21 114/80   07/08/21 110/62   12/17/20 116/80     Lab Results   Component Value Date    CHOL 219 01/27/2021    CHOL 151 01/24/2020    CHOL 146 01/24/2019     Lab Results   Component Value Date    TRIG 191 01/27/2021    TRIG 97 01/24/2020    TRIG 106 01/24/2019     Lab Results   Component Value Date    HDL 46 01/27/2021    HDL 38 01/24/2020    HDL 37 01/24/2019     Lab Results   Component Value Date    LDLCHOLESTEROL 121 10/06/2017    LDLCHOLESTEROL 159 (H) 10/18/2016    LDLCALC 135 01/27/2021    LDLCALC 94 01/24/2020    LDLCALC 88 01/24/2019     Lab Results   Component Value Date    VLDL 38 01/27/2021    VLDL 19 01/24/2020    VLDL 21 01/24/2019     Lab Results Component Value Date    CHOLHDLRATIO 2.9 2021    CHOLHDLRATIO 2.5 2020    CHOLHDLRATIO 3.9 2019       Plan:      Return in about 3 months (around 2022) for return for diabetes management, return for bp check/HTN. Orders Placed This Encounter   Procedures    INFLUENZA, MDCK QUADV, 2 YRS AND OLDER, IM, PF, PREFILL SYR OR SDV, 0.5ML (FLUCELVAX QUADV, PF)    POCT glycosylated hemoglobin (Hb A1C)     Orders Placed This Encounter   Medications    pioglitazone (ACTOS) 45 MG tablet     Sig: Take 1 tablet by mouth daily     Dispense:  90 tablet     Refill:  1    lisinopril (PRINIVIL;ZESTRIL) 2.5 MG tablet     Sig: Take 1 tab po qd     Dispense:  90 tablet     Refill:  3    Continuous Blood Gluc  (FREESTYLE PAPO 14 DAY READER) JOSEPH     Si each by Does not apply route continuous     Dispense:  3 each     Refill:  3    Continuous Blood Gluc Sensor (FREESTYLE PAPO 14 DAY SENSOR) MISC     Si each by Does not apply route continuous     Dispense:  3 each     Refill:  3     DM:  Uncontrolled but improving  Will increase Actos to 45 mg p.o. daily and continue other oral diabetic agents  Continue ACE and statin and aspirin also strict diabetic diet and regular exercise advised  Monitor feet daily for any changes  We will trial for continuous glucose monitor as patient is only checking her blood sugar about once per week  Flu vaccine given    Patient given educational materials - see patient instructions. Discussed use,benefit, and side effects of prescribed medications. All patient questions answered. Pt voiced understanding. Reviewed health maintenance. Instructed to continue currentmedications, diet and exercise.     Electronically signed by LORNA Washington CNP,CNP on 10/14/2021 at 1:21 PM

## 2021-10-14 NOTE — PROGRESS NOTES
Visit Information    Have you changed or started any medications since your last visit including any over-the-counter medicines, vitamins, or herbal medicines? no   Have you stopped taking any of your medications? Is so, why? -  no  Are you having any side effects from any of your medications? - no    Have you seen any other physician or provider since your last visit?  no   Have you had any other diagnostic tests since your last visit?  no   Have you been seen in the emergency room and/or had an admission in a hospital since we last saw you?  no   Have you had your routine dental cleaning in the past 6 months?  no     Do you have an active MyChart account? If no, what is the barrier?   Yes    Patient Care Team:  LORNA Mathur CNP as PCP - General (Nurse Practitioner)  LORNA Mathur CNP as PCP - [facility] Provider    Medical History Review  Past Medical, Family, and Social History reviewed and  contribute to the patient presenting condition    Health Maintenance   Topic Date Due    Hepatitis B vaccine (1 of 3 - Risk 3-dose series) Never done    Shingles Vaccine (2 of 3) 12/05/2017    Flu vaccine (1) 09/01/2021    A1C test (Diabetic or Prediabetic)  10/08/2021    DTaP/Tdap/Td vaccine (1 - Tdap) 12/17/2021 (Originally 6/27/1984)    Diabetic foot exam  12/17/2021    Lipid screen  01/27/2022    Potassium monitoring  01/27/2022    Creatinine monitoring  01/27/2022    Diabetic microalbuminuria test  07/08/2022    Diabetic retinal exam  08/25/2022    Breast cancer screen  09/09/2022    Colon cancer screen colonoscopy  11/18/2022    Pneumococcal 0-64 years Vaccine (2 of 2 - PPSV23) 06/27/2029    COVID-19 Vaccine  Completed    Hepatitis C screen  Completed    HIV screen  Completed    Hepatitis A vaccine  Aged Out    Hib vaccine  Aged Out    Meningococcal (ACWY) vaccine  Aged Out       After obtaining consent, and per orders of Eris Pickens CNP, injection of flu given in Left deltoid by Cody Tierney, [facility]. Patient instructed to remain in clinic for 20 minutes afterwards, and to report any adverse reaction to me immediately. Vaccine Information Sheet, \"Influenza - Inactivated\"  given to Susan Swenson, or parent/legal guardian of  Susan Swenson and verbalized understanding. Patient responses:    Have you ever had a reaction to a flu vaccine? No  Are you able to eat eggs without adverse effects? Yes  Do you have any current illness? No  Have you ever had Guillian Topock Syndrome? No    Flu vaccine given per order. Please see immunization tab.

## 2021-12-17 RX ORDER — ATORVASTATIN CALCIUM 20 MG/1
TABLET, FILM COATED ORAL
Qty: 90 TABLET | Refills: 3 | Status: SHIPPED | OUTPATIENT
Start: 2021-12-17 | End: 2022-01-26 | Stop reason: DRUGHIGH

## 2022-01-11 ENCOUNTER — TELEMEDICINE (OUTPATIENT)
Dept: FAMILY MEDICINE CLINIC | Age: 57
End: 2022-01-11
Payer: COMMERCIAL

## 2022-01-11 DIAGNOSIS — E11.65 UNCONTROLLED TYPE 2 DIABETES MELLITUS WITH HYPERGLYCEMIA (HCC): ICD-10-CM

## 2022-01-11 DIAGNOSIS — U07.1 COVID-19: Primary | ICD-10-CM

## 2022-01-11 DIAGNOSIS — E78.2 MIXED HYPERLIPIDEMIA: ICD-10-CM

## 2022-01-11 PROCEDURE — 99214 OFFICE O/P EST MOD 30 MIN: CPT | Performed by: NURSE PRACTITIONER

## 2022-01-11 RX ORDER — FLASH GLUCOSE SENSOR
1 KIT MISCELLANEOUS CONTINUOUS
Qty: 3 EACH | Refills: 3 | Status: SHIPPED | OUTPATIENT
Start: 2022-01-11 | End: 2022-01-24 | Stop reason: SDUPTHER

## 2022-01-11 RX ORDER — FLASH GLUCOSE SCANNING READER
1 EACH MISCELLANEOUS CONTINUOUS
Qty: 3 EACH | Refills: 3 | Status: SHIPPED | OUTPATIENT
Start: 2022-01-11 | End: 2022-08-30 | Stop reason: ALTCHOICE

## 2022-01-11 NOTE — PROGRESS NOTES
Ajith Rousseau (:  1965) is a 61 y.o. female,Established patient, here for evaluation of the following chief complaint(s): Diabetes         ASSESSMENT/PLAN:  1. COVID-19  Resolved  Ok to RTW tomorrow  Continue with mask wearing and Frequent handwashing    2. Uncontrolled type 2 diabetes mellitus with hyperglycemia ([address]  Uncontrolled based on random home glucoses  Referral placed for diabetic med mgmt to get this better controlled  She is maxed out on 4 oral agents and failed GLP_1 injections in the past  Will try new pharmacy for CGM  Continue strict diabetic diet/same meds for now and exercise as able  Monitor feet daily for changes  Update labs when able  Will call with test results    -     Continuous Blood Gluc  (FREESTYLE PAPO 14 DAY READER) [address]; 1 each by Does not apply route continuous, Disp-3 each, R-3Normal  -     Continuous Blood Gluc Sensor (FREESTYLE PAPO 14 DAY SENSOR) MISC; 1 each by Does not apply route continuous, Disp-3 each, R-3Normal  -     [address] Medication Mgmt (Diabetes - Clinical Pharmacy) - Emily Evens      Return if symptoms worsen or fail to improve. SUBJECTIVE/OBJECTIVE:  HPI   Patient calling in today for COVID-19 follow-up. States she developed symptoms on 1/3/2022 after her fiancé has been ill with this virus. States she has been utilizing over-the-counter medications which have been helpful and states her symptoms have resolved. She does plan to return to work tomorrow and overall feeling much better. Also calling in today for diabetic follow-up. She is only checking her sugars once in a while and are ranging from 160-210. Today it was 183 and then down to 174  Fasting 30 minutes later. States she is frustrated as she has been working hard to stick to a diabetic diet and has been eating sweets as much over the last month or so. She is trying to work on weight loss for her wedding this spring.   She is taking all medications as prescribed and denies any hypoglycemia. She was not able to get the continuous glucose monitor from her mail order and would like sent to her local pharmacy. She is open to other options for diet management. Denies numbness, tingling in her feet, weakness or dizziness. Side effects of any medication      Review of Systems    Patient-Reported Vitals 1/11/2022   Patient-Reported Weight 194   Patient-Reported Height 58        Physical Exam    [INSTRUCTIONS:  \"[x]\" Indicates a positive item  \"[]\" Indicates a negative item  -- DELETE ALL ITEMS NOT EXAMINED]    Constitutional: [x] Appears well-developed and well-nourished [x] No apparent distress      [] Abnormal -     Mental status: [x] Alert and awake  [x] Oriented to person/place/time [x] Able to follow commands    [] Abnormal -     Eyes:   EOM    [x]  Normal    [] Abnormal -   Sclera  [x]  Normal    [] Abnormal -          Discharge [x]  None visible   [] Abnormal -     HENT: [x] Normocephalic, atraumatic  [] Abnormal -   [x] Mouth/Throat: Mucous membranes are moist    External Ears [x] Normal  [] Abnormal -    Neck: [x] No visualized mass [] Abnormal -     Pulmonary/Chest: [x] Respiratory effort normal   [x] No visualized signs of difficulty breathing or respiratory distress        [] Abnormal -      Musculoskeletal:   [x] Normal gait with no signs of ataxia         [x] Normal range of motion of neck        [] Abnormal -     Neurological:        [x] No Facial Asymmetry (Cranial nerve 7 motor function) (limited exam due to video visit)          [x] No gaze palsy        [] Abnormal -          Skin:        [x] No significant exanthematous lesions or discoloration noted on facial skin         [] Abnormal -            Psychiatric:       [x] Normal Affect [] Abnormal -        [x] No Hallucinations    Other pertinent observable physical exam findings:-      Uriel Masters, was evaluated through a synchronous (real-time) audio-video encounter.  The patient (or guardian if applicable) is aware that this is a billable service. Verbal consent to proceed has been obtained within the past 12 months. The visit was conducted pursuant to the emergency declaration under the [facility], [address] [place] [address] authority and the [employer] General Act. Patient identification was verified, and a caregiver was present when appropriate. The patient was located in a state where the provider was credentialed to provide care. An electronic signature was used to authenticate this note.     --Washington Ards, APRN - CNP

## 2022-01-11 NOTE — PROGRESS NOTES
Visit Information    Have you changed or started any medications since your last visit including any over-the-counter medicines, vitamins, or herbal medicines? no   Have you stopped taking any of your medications? Is so, why? -  no  Are you having any side effects from any of your medications? - no    Have you seen any other physician or provider since your last visit?  no   Have you had any other diagnostic tests since your last visit?  no   Have you been seen in the emergency room and/or had an admission in a hospital since we last saw you?  no   Have you had your routine dental cleaning in the past 6 months?  no     Do you have an active MyChart account? If no, what is the barrier?   Yes    Patient Care Team:  LORNA Ochoa CNP as PCP - General (Nurse Practitioner)  LORNA Ochoa CNP as PCP - [facility]    Medical History Review  Past Medical, Family, and Social History reviewed and  contribute to the patient presenting condition    Health Maintenance   Topic Date Due    Hepatitis B vaccine (1 of 3 - Risk 3-dose series) Never done    DTaP/Tdap/Td vaccine (1 - Tdap) Never done    Shingles Vaccine (2 of 3) 12/05/2017    Diabetic foot exam  12/17/2021    Lipid screen  01/27/2022    Potassium monitoring  01/27/2022    Creatinine monitoring  01/27/2022    COVID-19 Vaccine (3 - Booster for Richards Peter series) 02/18/2022    Diabetic microalbuminuria test  07/08/2022    Depression Screen  07/08/2022    Diabetic retinal exam  08/25/2022    Breast cancer screen  09/09/2022    A1C test (Diabetic or Prediabetic)  10/14/2022    Colon cancer screen colonoscopy  11/18/2022    Pneumococcal 0-64 years Vaccine (2 of 2 - PPSV23) 06/27/2029    Flu vaccine  Completed    Hepatitis C screen  Completed    HIV screen  Completed    Hepatitis A vaccine  Aged Out    Hib vaccine  Aged Out    Meningococcal (ACWY) vaccine  Aged Out

## 2022-01-18 ENCOUNTER — TELEPHONE (OUTPATIENT)
Dept: FAMILY MEDICINE CLINIC | Age: 57
End: 2022-01-18

## 2022-01-18 NOTE — TELEPHONE ENCOUNTER
----- Message from Walter Velazquez sent at 1/18/2022  1:00 PM EST -----  Subject: Message to Provider    QUESTIONS  Information for Provider? Pt of Dr. Juan العلي? pt needs a letter stating she   is over covid, to get back into country, please call pt @ [phone]  ---------------------------------------------------------------------------  --------------  [address]  What is the best way for the office to contact you? OK to leave message on   voicemail  Preferred Call Back Phone Number? [phone]  ---------------------------------------------------------------------------  --------------  SCRIPT ANSWERS  Relationship to Patient?  Self

## 2022-01-19 ENCOUNTER — HOSPITAL ENCOUNTER (OUTPATIENT)
Dept: PHARMACY | Age: 57
Setting detail: THERAPIES SERIES
Discharge: HOME | End: 2022-01-19
Payer: COMMERCIAL

## 2022-01-19 VITALS
DIASTOLIC BLOOD PRESSURE: 78 MMHG | SYSTOLIC BLOOD PRESSURE: 126 MMHG | HEART RATE: 80 BPM | WEIGHT: 200.8 LBS | BODY MASS INDEX: 30.53 KG/M2

## 2022-01-19 LAB — HBA1C MFR BLD: 7.3 %

## 2022-01-19 PROCEDURE — 99212 OFFICE O/P EST SF 10 MIN: CPT

## 2022-01-19 PROCEDURE — 83036 HEMOGLOBIN GLYCOSYLATED A1C: CPT

## 2022-01-19 NOTE — PROGRESS NOTES
Diabetic Medication Management Program  [address]. [address]  Phone: [phone]  Fax: [phone]    NAME: [address] RECORD NUMBER:  [id number]  AGE: 61 y.o. GENDER: female  : 1965  EPISODE DATE:  2022       Ms. Garland Covarrubias was referred to [facility] Medication Management Services by Roe Cevallos NP. Patient acknowledges working in consult agreement with clinical pharmacist and this provider. Goals per referral:   Fasting blood glucose: < 130  Peak postprandial glucose: < 180  A1C: < 7    HPI     Recent diabetic course: Patient was diagnosed with diabetes a few years ago and reports trouble keeping her BG in control despite addition of medications. Last A1c was 8.3 in October, her A1c is down to 7.3 today indicating better BG control  Estimated date of diagnosis:   Has patient completed comprehensive diabetic education in the past? no  Recent ED visit or hospitalization related to diabetes? no        SUBJECTIVE     Ms. Garland Covarrubias is a 61 y.o. female here for the Diabetes Service for self-management education, medication review including over the counter medications and herbal products, overall wellbeing assessment, transition of care and any needed adjustments with updates and recommendations communicated to the referring physician.       Patient Findings: patient is just recovering from having COVID last week, has been watching carbs  []  Missed doses   []  Emergency Room Visit or Hospitalization    []  Medication changes   [x]  Diet changes   [x]  Acute illness   []  Activity changes      Symptoms of hypoglycemia   []  None    [x]  Shakiness    []  Lightheadedness or dizziness   []  Confusion      []  Sweating   []  Other       Symptoms of hyperglycemia   []  None   [x]  Frequent urination    [x]  Increased thirst   []  Other    Medication adverse reactions   [x]  None   []  Diarrhea / Nausea / Vomiting / Constipation / flatulence   [] Hypertension   []  Peripheral edema     []  Signs of infection   []  Headache   []  Vision changes   []  Increased cholesterol    []  Weight gain   []  Change in renal function   []  Increased potassium  []  Other      Comments:  Patient had feeling of fullness/nausea while taking Trulicity in the past. She states this developed after dose was increased. She also had elevated lipase of 395 so GLP1 was stopped by PCP. However patient tolerated Glyxambi in the past so addition of DPP4 inhibitor can be considered. I would also be in favor of re-challenging with GLP1 after checking a baseline lipase to see if patient is able to tolerate now. I feel that she would benefit greatly from this class of medications and may be able to discontinue Actos/glimepiride in the future. Recent hospital admission/ED visit related to diabetes? No .     OBJECTIVE     PMHx:    Past Medical History:   Diagnosis Date    [facility]     pre cancer cervix    Chronic pain of both shoulders 2018    Hyperlipidemia     Left sided sciatica 2018    Neuropathy     Type 2 diabetes mellitus ([facility]     Wears glasses        Social History:    Social History     Tobacco Use    Smoking status: Former Smoker     Packs/day: 2.00     Years: 6.00     Pack years: 12.00     Types: Cigarettes     Quit date: 1988     Years since quittin.5    Smokeless tobacco: Never Used   Substance Use Topics    Alcohol use:  Yes     Alcohol/week: 0.0 standard drinks     Comment: rare       Pertinent Labs:    Recent A1c: 7.3 which is down from A1c of 8.3 in October and nearing goal of > 7%    Lab Results   Component Value Date    LABA1C 7.3 2022    LABA1C 8.3 10/14/2021    LABA1C 9.0 2021     Lab Results   Component Value Date    CHOL 219 2021    TRIG 191 2021    HDL 46 2021    LDLCALC 135 2021     Lab Results   Component Value Date    CREATININE 0.60 2021    BUN 15 2021     2021    K 4.6 01/27/2021     01/27/2021    CO2 30 01/27/2021     Lab Results   Component Value Date    ALT 21 01/27/2021       Weight:  Wt Readings from Last 3 Encounters:   01/19/22 200 lb 12.8 oz (91.1 kg)   10/14/21 196 lb 12.8 oz (89.3 kg)   07/08/21 192 lb 12.8 oz (87.5 kg)       Current medications:  Prior to Admission medications    Medication Sig Start Date End Date Taking? Authorizing Provider   vitamin D (CHOLECALCIFEROL) 25 MCG (1000 UT) TABS tablet Take 1,000 Units by mouth daily   Yes Historical Provider, MD   blood glucose monitor supplies DISPENSE NEW BG MONITOR, STRIPS, LANCETS AND LANCING DEVICE COVERED UNDER PATIENT INSURANCE.  TESTING UP TO TID 1/19/22  Yes LORNA Lanier CNP   Continuous Blood Gluc  (FREESTYLE PAPO 14 DAY READER) JOSEPH 1 each by Does not apply route continuous 1/11/22  Yes LORNA Lanier - CNP   Continuous Blood Gluc Sensor (FREESTYLE PAPO 14 DAY SENSOR) MISC 1 each by Does not apply route continuous 1/11/22  Yes LORNA Lanier - CNP   atorvastatin (LIPITOR) 20 MG tablet TAKE 1 TABLET DAILY 12/17/21  Yes LORNA Lanier - CNP   XIGDUO XR  MG TB24 TAKE 1 TABLET BY MOUTH DAILY 12/13/21  Yes LORNA Lanier - CNP   pioglitazone (ACTOS) 45 MG tablet Take 1 tablet by mouth daily 10/14/21  Yes LORNA Lanier - CNP   lisinopril (PRINIVIL;ZESTRIL) 2.5 MG tablet Take 1 tab po qd 10/14/21  Yes LORNA Lanier - CNP   glimepiride (AMARYL) 4 MG tablet TAKE 1 TABLET TWICE A DAY BEFORE MEALS 5/13/21  Yes LORNA Lanier - CNP   pantoprazole (PROTONIX) 40 MG tablet TAKE 1 TABLET BY MOUTH EVERY MORNING BEFORE BREAKFAST  Patient taking differently: Indications: as needed  6/25/21   LORNA Lanier - CNP   lisinopril (PRINIVIL;ZESTRIL) 2.5 MG tablet TAKE 1 TABLET DAILY 7/5/20   LORNA Lanier - CNP   ONETOUCH VERIO strip Inject 1 each into the skin daily 7/22/19   Toronto Ards, APRN - CNP       Immunizations:   Most Recent Immunizations Administered Date(s) Administered    COVID-19, Pfizer Purple top, DILUTE for use, 12+ yrs, 30mcg/0.3mL dose 08/18/2021    Influenza, Intradermal, Quadrivalent, Preservative Free 10/05/2017    Influenza, MDCK Quadv, IM, PF (Flucelvax 2 yrs and older) 10/14/2021    Influenza, MDCK Quadv, with preserv IM (Flucelvax 2 yrs and older) 09/24/2020    Influenza, Quadv, IM, (6 mo and older Fluzone, Flulaval, Fluarix and 3 yrs and older Afluria) 11/07/2016    Influenza, Quadv, IM, PF (6 mo and older Fluzone, Flulaval, Fluarix, and 3 yrs and older Afluria) 10/14/2019    Pneumococcal Polysaccharide ([id number]) 11/07/2016    Zoster Live (Zostavax) 10/10/2017       Home Blood Glucose Results:       Blood glucose trends noted: Patient reports fasting BG is between 120 and 200, postprandial BG is occasionally over 200. However I checked her BG in our clinic and our meter read 122 while her meter read 160. I sent in new script for BG testing supplies to pharmacy in case her meter is not accurate. She is also waiting on pharmacy to order her Freestyle Isamar CGM so that she can begin using it to get a better idea of how food affects her BG    ASSESSMENT     What type of diabetes do you have? Type 2    DIABETES MANAGEMENT  Do you currently check your blood sugar levels at home?  Yes - testing 1-2 times per day  Discussed hypoglycemia and hyperglycemia    Immunizations:   Most Recent Immunizations   Administered Date(s) Administered    COVID-19, Pfizer Purple top, DILUTE for use, 12+ yrs, 30mcg/0.3mL dose 08/18/2021    Influenza, Intradermal, Quadrivalent, Preservative Free 10/05/2017    Influenza, MDCK Quadv, IM, PF (Flucelvax 2 yrs and older) 10/14/2021    Influenza, MDCK Quadv, with preserv IM (Flucelvax 2 yrs and older) 09/24/2020    Influenza, Quadv, IM, (6 mo and older Fluzone, Flulaval, Fluarix and 3 yrs and older Afluria) 11/07/2016    Influenza, Quadv, IM, PF (6 mo and older Fluzone, Flulaval, Fluarix, and 3 yrs and older Afluria) 10/14/2019    Pneumococcal Polysaccharide ([id number]) 11/07/2016    Zoster Live (Zostavax) 10/10/2017       I will recommend the following immunizations to patient: non    NUTRITION    Are you currently following any type of meal plan or diet (ex: high protein, high fat, low carb, vegan, etc)? Yes - watching carbs  Do you drink sugared beverages? No      Breakfast: eggs and toast or breakfast sandwich  Lunch: leftovers, snacks like hummus and pretzels, fruit, granola bars  Dinner: protein, veggie and starch  Beverages: water, black coffee, unsweetened tea, diet soda  Occasional desserts/bedtime snack      EXERCISE  What type of exercise(s) do you do? Walks 7-10 miles per day at work, wants to start doing more activity like yoga      A1c at goal: No: 7.3  Blood Pressure at goal: Yes  Weight at goal: Yes  Physical activity at goal: No: discussed working towards goal of 150 mins per week of aerobic activity  Smoking Cessation: Yes  Cholesterol at target: labs pending  Annual eye exam completed: Yes  Comprehensive Foot Exam Completed: No  Annual urine albumin and serum creatinine: Yes    Statin: no    ACE/ARB: Yes      Aspirin: no    Current Medications Affecting Diabetes:  Xigduo XR 10-1000mg daily  Glimepiride 4mg BID  Actos 45mg daily    Diabetic Regimen/Compliance: Compliant with regimen     Other Medication Compliance: Compliant with regimen     Medications attempted in the past:  Glyxambi, Trulicity     Recent exacerbations / new problems:  none         PLAN     Provided initial education on diabetic disease state, medications, monitoring blood glucose, diet, and exercise. Initial diabetic education materials given to patient including the following:   * Blood Glucose Log  *Living Well With Diabetes              * Using the Plate Method for a Balanced Meal Plan              * Things to Know About Hypoglycemia              *Smart Snacks              *A1c and Average Glucose Chart *Alcohol and Diabetes              *Preventative Care Resources: Eye Care, Podiatrists, Dentists, Behavioral Health    1. Discussed taking glimepiride before/with dinner instead of after dinner to better control postprandial BG  2. Check lipase to get baseline and consider re-challenging GLP1 (discussed more above)    3. As A1c is so close to goal she would like to lower carbs in diet and increase activity to hopefully lower A1c to goal range without addition of more medications - suggested max 45-60 grams of carbs with each meal and 20 grams with each snack  4.  I encouraged her to note when BG is higher than normal in the morning and whether this has anything to do with higher carb foods the night before    Physician Follow-up:  As scheduled    Medication Management Follow-up:   Diabetes Service 2/1/22     Electronically signed by Evelio Swain [facility] on 1/19/2022 at 3:46 PM    For Pharmacy Admin Tracking Only     Intervention Detail: Lab(s) Ordered and New Rx: 1, reason: Patient Preference   Total # of Interventions Recommended: 2   Total # of Interventions Accepted: 2   Time Spent (min): 60

## 2022-01-24 ENCOUNTER — PATIENT MESSAGE (OUTPATIENT)
Dept: FAMILY MEDICINE CLINIC | Age: 57
End: 2022-01-24

## 2022-01-24 DIAGNOSIS — E11.65 UNCONTROLLED TYPE 2 DIABETES MELLITUS WITH HYPERGLYCEMIA (HCC): ICD-10-CM

## 2022-01-24 LAB
ALBUMIN SERPL-MCNC: 4.5 G/DL
ALP BLD-CCNC: 72 U/L
ALT SERPL-CCNC: 17 U/L
ANION GAP SERPL CALCULATED.3IONS-SCNC: NORMAL MMOL/L
AST SERPL-CCNC: 20 U/L
BASOPHILS ABSOLUTE: 0 /ΜL
BASOPHILS RELATIVE PERCENT: 0.5 %
BILIRUB SERPL-MCNC: 0.5 MG/DL (ref 0.1–1.4)
BUN BLDV-MCNC: 13 MG/DL
CALCIUM SERPL-MCNC: 9.4 MG/DL
CHLORIDE BLD-SCNC: 103 MMOL/L
CHOLESTEROL, TOTAL: 207 MG/DL
CHOLESTEROL/HDL RATIO: 2.9
CO2: 31 MMOL/L
CREAT SERPL-MCNC: 0.6 MG/DL
EOSINOPHILS ABSOLUTE: 0.2 /ΜL
EOSINOPHILS RELATIVE PERCENT: 2.7 %
GFR CALCULATED: NORMAL
GLUCOSE BLD-MCNC: 160 MG/DL
HCT VFR BLD CALC: 43.3 % (ref 36–46)
HDLC SERPL-MCNC: 45 MG/DL (ref 35–70)
HEMOGLOBIN: 13.5 G/DL (ref 12–16)
LDL CHOLESTEROL CALCULATED: 133 MG/DL (ref 0–160)
LIPASE: 92 UNITS/L
LYMPHOCYTES ABSOLUTE: 2.8 /ΜL
LYMPHOCYTES RELATIVE PERCENT: 44.4 %
MCH RBC QN AUTO: 28 PG
MCHC RBC AUTO-ENTMCNC: 31.2 G/DL
MCV RBC AUTO: 89.8 FL
MONOCYTES ABSOLUTE: 0.4 /ΜL
MONOCYTES RELATIVE PERCENT: 7.2 %
NEUTROPHILS ABSOLUTE: 2.8 /ΜL
NEUTROPHILS RELATIVE PERCENT: 45 %
NONHDLC SERPL-MCNC: NORMAL MG/DL
PDW BLD-RTO: 14.1 %
PLATELET # BLD: 242 K/ΜL
PMV BLD AUTO: 9.9 FL
POTASSIUM SERPL-SCNC: 4.6 MMOL/L
RBC # BLD: 4.82 10^6/ΜL
SODIUM BLD-SCNC: 140 MMOL/L
TOTAL PROTEIN: 7.4
TRIGL SERPL-MCNC: 147 MG/DL
VLDLC SERPL CALC-MCNC: 29 MG/DL
WBC # BLD: 6.3 10^3/ML

## 2022-01-24 RX ORDER — FLASH GLUCOSE SENSOR
1 KIT MISCELLANEOUS CONTINUOUS
Qty: 3 EACH | Refills: 3 | Status: SHIPPED | OUTPATIENT
Start: 2022-01-24 | End: 2022-08-16

## 2022-01-24 NOTE — TELEPHONE ENCOUNTER
From: Peter Cohen  To: Ashanti Lancaster  Sent: 1/24/2022 4:08 PM EST  Subject: Prescription Question    Hi Constantinemehnaz Pickardelias     I just talked to [facility] when I picked up my prescription for that [facility] ,they sent me two readers and no sensors they said they don't have a prescription for the sensors . They need you to send that , these people are driving me crazy LOL . thank you for taking care of this.  Nestor Umana

## 2022-01-25 DIAGNOSIS — E78.2 MIXED HYPERLIPIDEMIA: ICD-10-CM

## 2022-01-25 DIAGNOSIS — E11.65 UNCONTROLLED TYPE 2 DIABETES MELLITUS WITH HYPERGLYCEMIA (HCC): ICD-10-CM

## 2022-01-25 DIAGNOSIS — R74.8 ELEVATED LIPASE: ICD-10-CM

## 2022-01-25 NOTE — TELEPHONE ENCOUNTER
Received labs from [facility] but did not get result for A1c or amylase, will request

## 2022-01-25 NOTE — TELEPHONE ENCOUNTER
From: Sai Rivera  To: Britney Esquivel  Sent: 1/24/2022 4:34 PM EST  Subject: Non-Urgent Medical Question    Ghislaine Pan I just wanted to let you know that the blood results are in I had them done today they are at [facility] in case they didn't send them to you

## 2022-01-26 RX ORDER — ATORVASTATIN CALCIUM 40 MG/1
40 TABLET, FILM COATED ORAL DAILY
Qty: 90 TABLET | Refills: 2 | Status: SHIPPED | OUTPATIENT
Start: 2022-01-26 | End: 2022-10-10

## 2022-02-01 ENCOUNTER — HOSPITAL ENCOUNTER (OUTPATIENT)
Dept: PHARMACY | Age: 57
Setting detail: THERAPIES SERIES
Discharge: HOME | End: 2022-02-01
Payer: COMMERCIAL

## 2022-02-01 PROCEDURE — 99212 OFFICE O/P EST SF 10 MIN: CPT

## 2022-02-01 NOTE — PROGRESS NOTES
up the sensors the other day. She put the sensor on and it was not working. When removing it she noticed the needle was bent so she will call the company for replacement. She will attempt to put another sensor on after her surgery Thursday. However this is not an affordable option for her long term as sensors are $50 each so she will go back to testing via fingerstick about once daily. OBJECTIVE     PMHx:    Past Medical History:   Diagnosis Date    Cancer ([facility]     pre cancer cervix    Chronic pain of both shoulders 2018    Hyperlipidemia     Left sided sciatica 2018    Neuropathy     Type 2 diabetes mellitus (HCC)     Wears glasses        Social History:    Social History     Tobacco Use    Smoking status: Former Smoker     Packs/day: 2.00     Years: 6.00     Pack years: 12.00     Types: Cigarettes     Quit date: 1988     Years since quittin.5    Smokeless tobacco: Never Used   Substance Use Topics    Alcohol use:  Yes     Alcohol/week: 0.0 standard drinks     Comment: rare       Pertinent Labs:    Lab Results   Component Value Date    LABA1C 7.3 2022    LABA1C 8.3 10/14/2021    LABA1C 9.0 2021     Lab Results   Component Value Date    CHOL 207 2022    TRIG 147 2022    HDL 45 2022    LDLCALC 133 2022     Lab Results   Component Value Date    CREATININE 0.60 2022    BUN 13 2022     2022    K 4.6 2022     2022    CO2 31 2022     Lab Results   Component Value Date    ALT 17 2022       Weight:  Wt Readings from Last 3 Encounters:   22 200 lb 12.8 oz (91.1 kg)   10/14/21 196 lb 12.8 oz (89.3 kg)   21 192 lb 12.8 oz (87.5 kg)       Blood Pressure:  BP Readings from Last 3 Encounters:   22 126/78   10/14/21 114/80   21 110/62       Current medications:    Current Outpatient Medications:     linagliptin (TRADJENTA) 5 MG tablet, Take 1 tablet by mouth daily, Disp: 90 tablet, Rfl: 1 atorvastatin (LIPITOR) 40 MG tablet, Take 1 tablet by mouth daily, Disp: 90 tablet, Rfl: 2    Continuous Blood Gluc Sensor (FREESTYLE PAPO 14 DAY SENSOR) MISC, 1 each by Does not apply route continuous, Disp: 3 each, Rfl: 3    vitamin D (CHOLECALCIFEROL) 25 MCG (1000 UT) TABS tablet, Take 1,000 Units by mouth daily, Disp: , Rfl:     blood glucose monitor supplies, DISPENSE NEW BG MONITOR, STRIPS, LANCETS AND LANCING DEVICE COVERED UNDER PATIENT INSURANCE.  TESTING UP TO TID, Disp: 100 each, Rfl: 3    Continuous Blood Gluc  (FREESTYLE PAPO 14 DAY READER) JOSEPH, 1 each by Does not apply route continuous, Disp: 3 each, Rfl: 3    XIGDUO XR  MG TB24, TAKE 1 TABLET BY MOUTH DAILY, Disp: 30 tablet, Rfl: 9    pioglitazone (ACTOS) 45 MG tablet, Take 1 tablet by mouth daily, Disp: 90 tablet, Rfl: 1    lisinopril (PRINIVIL;ZESTRIL) 2.5 MG tablet, Take 1 tab po qd, Disp: 90 tablet, Rfl: 3    pantoprazole (PROTONIX) 40 MG tablet, TAKE 1 TABLET BY MOUTH EVERY MORNING BEFORE BREAKFAST (Patient taking differently: Indications: as needed ), Disp: 90 tablet, Rfl: 0    glimepiride (AMARYL) 4 MG tablet, TAKE 1 TABLET TWICE A DAY BEFORE MEALS, Disp: 180 tablet, Rfl: 3    ONETOUCH VERIO strip, Inject 1 each into the skin daily, Disp: 100 each, Rfl: 3    Immunizations:   Most Recent Immunizations   Administered Date(s) Administered    COVID-19, Pfizer Purple top, DILUTE for use, 12+ yrs, 30mcg/0.3mL dose 08/18/2021    Influenza, Intradermal, Quadrivalent, Preservative Free 10/05/2017    Influenza, MDCK Quadv, IM, PF (Flucelvax 2 yrs and older) 10/14/2021    Influenza, MDCK Quadv, with preserv IM (Flucelvax 2 yrs and older) 09/24/2020    Influenza, Quadv, IM, (6 mo and older Fluzone, Flulaval, Fluarix and 3 yrs and older Afluria) 11/07/2016    Influenza, Quadv, IM, PF (6 mo and older Fluzone, Flulaval, Fluarix, and 3 yrs and older Afluria) 10/14/2019    Pneumococcal Polysaccharide ([id number]) 11/07/2016    Zoster Live (Zostavax) 10/10/2017       Home Blood Glucose Results: See below        Blood glucose trends noted:  Last week BG was slightly above goal both before and after meals. This week, patient reports she has been going great with her low carb diet and BG is in goal most of the time. ASSESSMENT     Recent A1c: 7.3    Lab Results   Component Value Date    LABA1C 7.3 01/19/2022    LABA1C 8.3 10/14/2021    LABA1C 9.0 07/08/2021        Eating patterns: Low carb diet - see above    Exercise patterns: Walking at work, no change since last visit     Blood Glucose Testing: Patient is currently using CGM when sensor available and testing via fingerstick 2-4 times daily    Current Diabetic Medications: Actos 45mg daily, glimepiride 4mg BID with meals, Xigduo BID     Diabetic Regimen/Compliance: patient occasionally misses glimepiride dose with dinner     Other Medication Compliance: did not assess      PLAN     I would like to get her off actos and glimepiride so I sent script for Amedeo Alfredo to her pharmacy (she tolerated Glyxambi in the past which has Amedeo Alfredo in it). If affordable she will start taking this once daily in the morning and STOP glimepiride. After 1 week if her BG is still in goal range most of the time she will also stop Actos. She is aware to keep me informed about any low blood glucose as she had last night - we may need to discontinue actos sooner than planned    Diabetic Action Plan is shown below. Patient and provider worked together to create goals which patient will work toward prior to the next appointment.         Physician Follow-up:  As scheduled    Medication Management Follow-up:   Diabetes Service  phone call in 2 weeks and office visit in 4 weeks     Electronically signed by Hay Hendrix [facility] on 2/1/2022 at 10:27 AM    For Pharmacy Admin Tracking Only    Intervention Detail: New Rx: 1, reason: Needs Additional Therapy  Total # of Interventions Recommended: 1  Total # of Interventions Accepted: 1  Time Spent (min): 60

## 2022-02-15 ENCOUNTER — TELEPHONE (OUTPATIENT)
Dept: PHARMACY | Age: 57
End: 2022-02-15

## 2022-02-15 NOTE — TELEPHONE ENCOUNTER
Diabetes Medication Management Telephone Follow-Up     Blood glucose readings and trends: Patient states morning BG is unchanged, around 125-140. Occasionally BG rises to around 250 after a meal but it seems to be in response to high carb meals. Still experiencing occasional low BG as well. Missed doses no  Medication changes  Yes - patient stopped Actos after having low BG 2 weeks ago. She is still on Xigduo and Glimepiride. I called in tradjenta but it was $159 so patient did not  yet. She will download coupon from [email or web address]. com and call this clinic if cost is still too high after coupon has been applied. Diet/exercise changes  no  Symptoms of hypoglycemia yes - dizziness  Symptoms of hyperglycemia no - none  Medication adverse reactions none    Plan: If Laurence Jennings is affordable start taking it and stop glimepiride. If tradjenta is not affordable call this clinic and I will send in alternate.     Next follow-up: 3/15/22 or sooner via phone if needed    For [address] [place] [address] Intervention Detail:    Total # of Interventions Recommended: 0   Total # of Interventions Accepted: 0   Time Spent (min): 15

## 2022-03-15 ENCOUNTER — HOSPITAL ENCOUNTER (OUTPATIENT)
Dept: PHARMACY | Age: 57
Setting detail: THERAPIES SERIES
Discharge: HOME | End: 2022-03-15
Payer: COMMERCIAL

## 2022-03-15 VITALS
HEART RATE: 58 BPM | DIASTOLIC BLOOD PRESSURE: 76 MMHG | SYSTOLIC BLOOD PRESSURE: 121 MMHG | BODY MASS INDEX: 29.5 KG/M2 | WEIGHT: 194 LBS

## 2022-03-15 PROCEDURE — 99212 OFFICE O/P EST SF 10 MIN: CPT

## 2022-03-15 NOTE — PROGRESS NOTES
Smokeless tobacco: Never Used   Substance Use Topics    Alcohol use: Yes     Alcohol/week: 0.0 standard drinks     Comment: rare       Pertinent Labs:    Lab Results   Component Value Date    LABA1C 7.3 01/19/2022    LABA1C 8.3 10/14/2021    LABA1C 9.0 07/08/2021     Lab Results   Component Value Date    CHOL 207 01/24/2022    TRIG 147 01/24/2022    HDL 45 01/24/2022    LDLCALC 133 01/24/2022     Lab Results   Component Value Date    CREATININE 0.60 01/24/2022    BUN 13 01/24/2022     01/24/2022    K 4.6 01/24/2022     01/24/2022    CO2 31 01/24/2022     Lab Results   Component Value Date    ALT 17 01/24/2022       Weight:  Wt Readings from Last 3 Encounters:   01/19/22 200 lb 12.8 oz (91.1 kg)   10/14/21 196 lb 12.8 oz (89.3 kg)   07/08/21 192 lb 12.8 oz (87.5 kg)       Blood Pressure:  BP Readings from Last 3 Encounters:   01/19/22 126/78   10/14/21 114/80   07/08/21 110/62       Current medications:    Current Outpatient Medications:     linagliptin (TRADJENTA) 5 MG tablet, Take 1 tablet by mouth daily, Disp: 90 tablet, Rfl: 1    atorvastatin (LIPITOR) 40 MG tablet, Take 1 tablet by mouth daily, Disp: 90 tablet, Rfl: 2    Continuous Blood Gluc Sensor (FREESTYLE PAPO 14 DAY SENSOR) MISC, 1 each by Does not apply route continuous, Disp: 3 each, Rfl: 3    vitamin D (CHOLECALCIFEROL) 25 MCG (1000 UT) TABS tablet, Take 1,000 Units by mouth daily, Disp: , Rfl:     blood glucose monitor supplies, DISPENSE NEW BG MONITOR, STRIPS, LANCETS AND LANCING DEVICE COVERED UNDER PATIENT INSURANCE.  TESTING UP TO TID, Disp: 100 each, Rfl: 3    Continuous Blood Gluc  (FREESTYLE PAPO 14 DAY READER) JOSEPH, 1 each by Does not apply route continuous, Disp: 3 each, Rfl: 3    XIGDUO XR  MG TB24, TAKE 1 TABLET BY MOUTH DAILY, Disp: 30 tablet, Rfl: 9    lisinopril (PRINIVIL;ZESTRIL) 2.5 MG tablet, Take 1 tab po qd, Disp: 90 tablet, Rfl: 3    pantoprazole (PROTONIX) 40 MG tablet, TAKE 1 TABLET BY MOUTH EVERY MORNING BEFORE BREAKFAST (Patient taking differently: Indications: as needed ), Disp: 90 tablet, Rfl: 0    glimepiride (AMARYL) 4 MG tablet, TAKE 1 TABLET TWICE A DAY BEFORE MEALS, Disp: 180 tablet, Rfl: 3    ONETOUCH VERIO strip, Inject 1 each into the skin daily, Disp: 100 each, Rfl: 3    Immunizations:   Most Recent Immunizations   Administered Date(s) Administered    COVID-19, Pfizer Purple top, DILUTE for use, 12+ yrs, 30mcg/0.3mL dose 08/18/2021    Influenza, Intradermal, Quadrivalent, Preservative Free 10/05/2017    Influenza, MDCK Quadv, IM, PF (Flucelvax 2 yrs and older) 10/14/2021    Influenza, MDCK Quadv, with preserv IM (Flucelvax 2 yrs and older) 09/24/2020    Influenza, Quadv, IM, (6 mo and older Fluzone, Flulaval, Fluarix and 3 yrs and older Afluria) 11/07/2016    Influenza, Quadv, IM, PF (6 mo and older Fluzone, Flulaval, Fluarix, and 3 yrs and older Afluria) 10/14/2019    Pneumococcal Polysaccharide ([id number]) 11/07/2016    Zoster Live (Zostavax) 10/10/2017       Home Blood Glucose Results: See below          Blood glucose trends noted:  Morning BG higher than is had been previously - running around 170 over the last few days    ASSESSMENT     Recent A1c: 7.3    Lab Results   Component Value Date    LABA1C 7.3 01/19/2022    LABA1C 8.3 10/14/2021    LABA1C 9.0 07/08/2021        Eating patterns: Low carb diet - see above     Exercise patterns: Walking at work, no change since last visit     Blood Glucose Testing: Patient is currently using CGM when sensor available and testing via fingerstick 2-4 times daily     Current Diabetic Medications: Xigduo BID, Tradjenta 5mg daily     Diabetic Regimen/Compliance: compliant     Other Medication Compliance: did not assess      PLAN     Resume glimepiride at half dose - 2mg BID with meals  Continue tradjent and Xigduo  Continue low carb diet and increase activity    Diabetic Action Plan is shown below.  Patient and provider worked together to create goals which patient will work toward prior to the next appointment.         Physician Follow-up:  As scheduled    Medication Management Follow-up:   Diabetes Service  4/19/22 - do A1c    Electronically signed by Felipa Chandra [facility] on 3/15/2022 at [address] Tracking Only    Intervention Detail: Dose Adjustment: 1, reason: Therapy Optimization  Total # of Interventions Recommended: 1  Total # of Interventions Accepted: 1  Time Spent (min): 60

## 2022-05-03 ENCOUNTER — APPOINTMENT (OUTPATIENT)
Dept: PHARMACY | Age: 57
End: 2022-05-03
Payer: COMMERCIAL

## 2022-05-03 ENCOUNTER — TELEPHONE (OUTPATIENT)
Dept: PHARMACY | Age: 57
End: 2022-05-03

## 2022-05-11 ENCOUNTER — HOSPITAL ENCOUNTER (OUTPATIENT)
Dept: PHARMACY | Age: 57
Setting detail: THERAPIES SERIES
Discharge: HOME | End: 2022-05-11
Payer: COMMERCIAL

## 2022-05-11 VITALS
WEIGHT: 190 LBS | SYSTOLIC BLOOD PRESSURE: 112 MMHG | BODY MASS INDEX: 28.79 KG/M2 | DIASTOLIC BLOOD PRESSURE: 76 MMHG | HEIGHT: 68 IN | HEART RATE: 80 BPM

## 2022-05-11 LAB — HBA1C MFR BLD: 7 %

## 2022-05-11 PROCEDURE — 99212 OFFICE O/P EST SF 10 MIN: CPT

## 2022-05-11 PROCEDURE — 83036 HEMOGLOBIN GLYCOSYLATED A1C: CPT

## 2022-05-11 RX ORDER — GLIMEPIRIDE 4 MG/1
4 TABLET ORAL
COMMUNITY
End: 2022-06-23

## 2022-05-11 RX ORDER — BLOOD-GLUCOSE SENSOR
EACH MISCELLANEOUS
Qty: 3 EACH | Refills: 3 | Status: SHIPPED | OUTPATIENT
Start: 2022-05-11 | End: 2022-08-13 | Stop reason: SDUPTHER

## 2022-05-11 RX ORDER — BLOOD-GLUCOSE TRANSMITTER
EACH MISCELLANEOUS
Qty: 1 EACH | Refills: 3 | Status: SHIPPED | OUTPATIENT
Start: 2022-05-11 | End: 2022-08-16 | Stop reason: SDUPTHER

## 2022-05-11 NOTE — PROGRESS NOTES
Diabetic Medication Management Program  [facility] [place] - INPATIENT  [address] [facility]. [place], [address] [facility]  Phone: [phone]  Fax: [phone]    NAME: [address] RECORD NUMBER:  [id number]  AGE: 61 y.o. GENDER: female  : 1965  EPISODE DATE:  2022     Ms. Leroy Giles was referred to [facility] by Tim Kaminski NP. Patient acknowledges working in consult agreement with clinical pharmacist and this provider. Goals per referral:   Fasting blood glucose: < 130  Peak postprandial glucose: < 180  A1C: < 7    SUBJECTIVE     Ms. Leroy Giles is a 61 y.o. female here for the Diabetes Service for self-management education, medication review including over the counter medications and herbal products, overall wellbeing assessment, transition of care and any needed adjustments with updates and recommendations communicated to the referring physician. Patient Findings:     Medication changes  no  Diet changes  no  Activity changes  no  Emergency Room Visit or Hospitalization  no  Acute Illness/new problems  no  Symptoms of hypoglycemia  no - none  Symptoms of hyperglycemia  no - none  Medication adverse reactions  none       OBJECTIVE     PMHx:    Past Medical History:   Diagnosis Date    Cancer ([facility]     pre cancer cervix    Chronic pain of both shoulders 2018    Hyperlipidemia     Left sided sciatica 2018    Neuropathy     Type 2 diabetes mellitus ([facility]     Wears glasses        Social History:    Social History     Tobacco Use    Smoking status: Former Smoker     Packs/day: 2.00     Years: 6.00     Pack years: 12.00     Types: Cigarettes     Quit date: 1988     Years since quittin.8    Smokeless tobacco: Never Used   Substance Use Topics    Alcohol use:  Yes     Alcohol/week: 0.0 standard drinks     Comment: rare       Pertinent Labs:    Lab Results   Component Value Date    LABA1C 7.0 2022    LABA1C 7.3 2022    LABA1C 8.3 10/14/2021     Lab Results   Component Value Date    CHOL 207 01/24/2022    TRIG 147 01/24/2022    HDL 45 01/24/2022    LDLCALC 133 01/24/2022     Lab Results   Component Value Date    CREATININE 0.60 01/24/2022    BUN 13 01/24/2022     01/24/2022    K 4.6 01/24/2022     01/24/2022    CO2 31 01/24/2022     Lab Results   Component Value Date    ALT 17 01/24/2022       Weight:  Wt Readings from Last 3 Encounters:   05/11/22 190 lb (86.2 kg)   03/15/22 194 lb (88 kg)   01/19/22 200 lb 12.8 oz (91.1 kg)       Blood Pressure:  BP Readings from Last 3 Encounters:   05/11/22 112/76   03/15/22 121/76   01/19/22 126/78       Current medications:    Current Outpatient Medications:     glimepiride (AMARYL) 4 MG tablet, Take 4 mg by mouth 2 times daily (before meals), Disp: , Rfl:     Continuous Blood Gluc Sensor (DEXCOM G6 SENSOR) MISC, Change every 10 days, Disp: 3 each, Rfl: 3    Continuous Blood Gluc Transmit (DEXCOM G6 TRANSMITTER) MISC, Change every 3 months, Disp: 1 each, Rfl: 3    atorvastatin (LIPITOR) 40 MG tablet, Take 1 tablet by mouth daily, Disp: 90 tablet, Rfl: 2    Continuous Blood Gluc Sensor (FREESTYLE PAPO 14 DAY SENSOR) MISC, 1 each by Does not apply route continuous, Disp: 3 each, Rfl: 3    vitamin D (CHOLECALCIFEROL) 25 MCG (1000 UT) TABS tablet, Take 1,000 Units by mouth daily, Disp: , Rfl:     blood glucose monitor supplies, DISPENSE NEW BG MONITOR, STRIPS, LANCETS AND LANCING DEVICE COVERED UNDER PATIENT INSURANCE.  TESTING UP TO TID, Disp: 100 each, Rfl: 3    Continuous Blood Gluc  (FREESTYLE PAPO 14 DAY READER) JOSEPH, 1 each by Does not apply route continuous, Disp: 3 each, Rfl: 3    XIGDUO XR  MG TB24, TAKE 1 TABLET BY MOUTH DAILY, Disp: 30 tablet, Rfl: 9    lisinopril (PRINIVIL;ZESTRIL) 2.5 MG tablet, Take 1 tab po qd, Disp: 90 tablet, Rfl: 3    pantoprazole (PROTONIX) 40 MG tablet, TAKE 1 TABLET BY MOUTH EVERY MORNING BEFORE BREAKFAST (Patient taking differently: Indications: as needed ), Disp: 90 tablet, Rfl: 0    ONETOUCH VERIO strip, Inject 1 each into the skin daily, Disp: 100 each, Rfl: 3    Immunizations:   Most Recent Immunizations   Administered Date(s) Administered    COVID-19, Pfizer Purple top, DILUTE for use, 12+ yrs, 30mcg/0.3mL dose 2021    Influenza, Intradermal, Quadrivalent, Preservative Free 10/05/2017    Influenza, MDCK Quadv, IM, PF (Flucelvax 2 yrs and older) 10/14/2021    Influenza, MDCK Quadv, with preserv IM (Flucelvax 2 yrs and older) 2020    Influenza, Bernis Bump, IM, (6 mo and older Fluzone, Flulaval, Fluarix and 3 yrs and older Afluria) 2016    Influenza, Quadv, IM, PF (6 mo and older Fluzone, Flulaval, Fluarix, and 3 yrs and older Afluria) 10/14/2019    Pneumococcal Polysaccharide ([id number]) 2016    Zoster Live (Zostavax) 10/10/2017       Home Blood Glucose Results: patient did not test often due to being busy/getting      She wrote down a few BG readings over the last 2 weeks:   Fastin, 102, 137  Postprandial: 147, 148    Blood glucose trends noted: Fasting BG above goal sometimes possibly due to eating dinner late, she will make sure she eats 2 hrs before going to bed    ASSESSMENT     Recent A1c: 7.0, down from 7.3 four months ago    Lab Results   Component Value Date    LABA1C 7.0 2022    LABA1C 7.3 2022    LABA1C 8.3 10/14/2021        Eating patterns: Patient has been doing well staying under 45 grams of carbs per meal and 20 grams per snack. She got  recently and had more carbs than usual so she could enjoy her birthday cake. Exercise patterns: active at work, walking outside when able     Blood Glucose Testing: testing periodically, less often than she had before.  Ordered Dexcom today to see if it will be covered at pharmacy    Current Diabetic Medications: Xigduo, glimepiride 4mg BID, Tradjenta 5mg daily      Diabetic Regimen/Compliance: compliant     Other Medication Compliance: compliant      PLAN     1. Sent Dexcom to pharmacy in hopes it will be less expensive than Isamar  2. A1c 7.0 today which is down from 7.3 in January  3. Patient to discontinue Tradjenta after she uses the rest of the bottle as she does not feel it helped her BG enough to justify cost  4. Education sessions to begin via phone on 6/7, next office visit on 8/16 for A1c  5. Patient will call if BG rises significantly or if she experiences hypoglycemia. If she needs another agent in the future will consider resuming Trulicity or Actos    Diabetic Action Plan is shown below. Patient and provider worked together to create goals which patient will work toward prior to the next appointment.         Physician Follow-up:  As scheduled    Medication Management Follow-up:   Diabetes Service  6/7/22    Electronically signed by FRANKLYN Kenney [facility] on 5/11/2022 at 2:29 PM    For Pharmacy Admin Tracking Only     Intervention Detail: Lab(s) Ordered and Refill(s) Provided   Total # of Interventions Recommended: 2   Total # of Interventions Accepted: 2   Time Spent (min): 45

## 2022-05-25 RX ORDER — PIOGLITAZONEHYDROCHLORIDE 45 MG/1
45 TABLET ORAL DAILY
Qty: 90 TABLET | Refills: 1 | OUTPATIENT
Start: 2022-05-25

## 2022-06-07 ENCOUNTER — HOSPITAL ENCOUNTER (OUTPATIENT)
Dept: PHARMACY | Age: 57
Setting detail: THERAPIES SERIES
Discharge: HOME | End: 2022-06-07
Payer: COMMERCIAL

## 2022-06-07 PROCEDURE — 99211 OFF/OP EST MAY X REQ PHY/QHP: CPT

## 2022-06-07 NOTE — PROGRESS NOTES
Diabetic Medication Management Program  [address]. [address]  Phone: [phone]  Fax: [phone]    NAME: Nasra Smith. RECORD NUMBER:  [id number]  AGE: 61 y.o. GENDER: female  : 1965  EPISODE DATE:  2022     Ms. Wes Barron was referred to [facility] by Adolfo Pearson NP. Patient acknowledges working in consult agreement with clinical pharmacist and this provider. Goals per referral:   Fasting blood glucose: < 130  Peak postprandial glucose: < 180  A1C: < 7    SUBJECTIVE     Ms. Wes Barron is a 61 y.o. female here for the Diabetes Service for self-management education, medication review including over the counter medications and herbal products, overall wellbeing assessment, transition of care and any needed adjustments with updates and recommendations communicated to the referring physician. Patient Findings:     Medication changes  Yes: Patient stopped Doug Jungling a few weeks ago. She states she did not see any effect on BG after discontinuation   Diet changes  Yes: she was in [place] last week and states she ate more carbs than usual. Prior to vacation diet was similar to how it had been  Activity changes  Yes: In [facility] patient walked much more than she does at home.  She plans on continuing to walk more now that the weather is nicer  Emergency Room Visit or Hospitalization  no  Acute Illness/new problems  no  Symptoms of hypoglycemia  no - none  Symptoms of hyperglycemia  yes - blurry vision and polyuria - happens occasionally when BG is high  Medication adverse reactions  none       OBJECTIVE     PMHx:    Past Medical History:   Diagnosis Date    Cancer ([facility]     pre cancer cervix    Chronic pain of both shoulders 2018    Hyperlipidemia     Left sided sciatica 2018    Neuropathy     Type 2 diabetes mellitus ([facility] [facility]     Wears glasses        Social History:    Social History     Tobacco Use    Smoking status: Former Smoker     Packs/day: 2.00     Years: 6.00     Pack years: 12.00     Types: Cigarettes     Quit date: 1988     Years since quittin.8    Smokeless tobacco: Never Used   Substance Use Topics    Alcohol use: Yes     Alcohol/week: 0.0 standard drinks     Comment: rare       Pertinent Labs:    Lab Results   Component Value Date    LABA1C 7.0 2022    LABA1C 7.3 2022    LABA1C 8.3 10/14/2021     Lab Results   Component Value Date    CHOL 207 2022    TRIG 147 2022    HDL 45 2022    LDLCALC 133 2022     Lab Results   Component Value Date    CREATININE 0.60 2022    BUN 13 2022     2022    K 4.6 2022     2022    CO2 31 2022     Lab Results   Component Value Date    ALT 17 2022       Weight:  Wt Readings from Last 3 Encounters:   22 190 lb (86.2 kg)   03/15/22 194 lb (88 kg)   22 200 lb 12.8 oz (91.1 kg)       Blood Pressure:  BP Readings from Last 3 Encounters:   22 112/76   03/15/22 121/76   22 126/78       Current medications:    Current Outpatient Medications:     glimepiride (AMARYL) 4 MG tablet, Take 4 mg by mouth 2 times daily (before meals), Disp: , Rfl:     Continuous Blood Gluc Sensor (DEXCOM G6 SENSOR) MISC, Change every 10 days, Disp: 3 each, Rfl: 3    Continuous Blood Gluc Transmit (DEXCOM G6 TRANSMITTER) MISC, Change every 3 months, Disp: 1 each, Rfl: 3    atorvastatin (LIPITOR) 40 MG tablet, Take 1 tablet by mouth daily, Disp: 90 tablet, Rfl: 2    Continuous Blood Gluc Sensor (FREESTYLE PAPO 14 DAY SENSOR) MISC, 1 each by Does not apply route continuous, Disp: 3 each, Rfl: 3    vitamin D (CHOLECALCIFEROL) 25 MCG (1000 UT) TABS tablet, Take 1,000 Units by mouth daily, Disp: , Rfl:     blood glucose monitor supplies, DISPENSE NEW BG MONITOR, STRIPS, LANCETS AND LANCING DEVICE COVERED UNDER PATIENT INSURANCE.  TESTING UP TO TID, Disp: 100 each, Rfl: 3    Continuous Blood Gluc  (FREESTYLE PAPO 14 DAY READER) JOSEPH, 1 each by Does not apply route continuous, Disp: 3 each, Rfl: 3    XIGDUO XR  MG TB24, TAKE 1 TABLET BY MOUTH DAILY, Disp: 30 tablet, Rfl: 9    lisinopril (PRINIVIL;ZESTRIL) 2.5 MG tablet, Take 1 tab po qd, Disp: 90 tablet, Rfl: 3    pantoprazole (PROTONIX) 40 MG tablet, TAKE 1 TABLET BY MOUTH EVERY MORNING BEFORE BREAKFAST (Patient taking differently: Indications: as needed ), Disp: 90 tablet, Rfl: 0    ONETOUCH VERIO strip, Inject 1 each into the skin daily, Disp: 100 each, Rfl: 3    Immunizations:   Most Recent Immunizations   Administered Date(s) Administered    COVID-19, Pfizer Purple top, DILUTE for use, 12+ yrs, 30mcg/0.3mL dose 08/18/2021    Influenza, Intradermal, Quadrivalent, Preservative Free 10/05/2017    Influenza, MDCK Quadv, IM, PF (Flucelvax 2 yrs and older) 10/14/2021    Influenza, MDCK Quadv, with preserv IM (Flucelvax 2 yrs and older) 09/24/2020    Influenza, Quadv, IM, (6 mo and older Fluzone, Flulaval, Fluarix and 3 yrs and older Afluria) 11/07/2016    Influenza, Quadv, IM, PF (6 mo and older Fluzone, Flulaval, Fluarix, and 3 yrs and older Afluria) 10/14/2019    Pneumococcal Polysaccharide ([id number]) 11/07/2016    Zoster Live (Zostavax) 10/10/2017       Home Blood Glucose Results: See below    Blood glucose trends noted:  Fasting BG has been above goal lately likely due to bedtime snacking. Patient agrees to work on not eating within 2 hours before bedtime to see if this helps to lower morning glucose. ASSESSMENT     Recent A1c: 7.0    Lab Results   Component Value Date    LABA1C 7.0 05/11/2022    LABA1C 7.3 01/19/2022    LABA1C 8.3 10/14/2021        Eating patterns: relatively low carb aside from vacation last week. Now that patient is back home she will work on resuming low carb diet with no more than 45 grams of carbs per meal    Exercise patterns:  Activity was great while on vacation and she will attempt to continue this trend now that she is back home by walking/biking or joining a gym. Blood Glucose Testing:  Testing 1-2 times daily    Current Diabetic Medications: Xigduo, glimepiride     Diabetic Regimen/Compliance: compliant      Other Medication Compliance: did not assess    Completed Journey for Control Session 1: \"Let's Talk About Managing Your Diabetes\". Interactive discussion occurred regarding topics which include: Understanding Diabetes, The Emotional Journey, Having the Infomration You Need, Blood Glucose, Monitoring, Managing Diabetes with Healthy Eating, Managing Diabetes by Keeping Active, Managing Diabetes with Medicines, and Going for your Goal and Your Support Network    PLAN     Work on eliminating bedtime snack to hopefully keep morning BG under 130. If BG remains high in the morning we will add medication - either try Trulicity again or add Actos. Diabetic Action Plan is shown below. Patient and provider worked together to create goals which patient will work toward prior to the next appointment.         Physician Follow-up:  As scheduled    Medication Management Follow-up:   Diabetes Service  7/5/22    Electronically signed by Dulce Leung [facility] on 6/7/2022 at [address] [facility] [address] Only    Intervention Detail:   Total # of Interventions Recommended: 0  Total # of Interventions Accepted: 0  Time Spent (min): 60

## 2022-06-14 ENCOUNTER — TELEPHONE (OUTPATIENT)
Dept: PHARMACY | Age: 57
End: 2022-06-14

## 2022-06-14 DIAGNOSIS — E11.65 UNCONTROLLED TYPE 2 DIABETES MELLITUS WITH HYPERGLYCEMIA (HCC): ICD-10-CM

## 2022-06-14 RX ORDER — BLOOD SUGAR DIAGNOSTIC
1 STRIP MISCELLANEOUS 3 TIMES DAILY
Qty: 300 EACH | Refills: 3 | Status: SHIPPED | OUTPATIENT
Start: 2022-06-14

## 2022-06-14 RX ORDER — BLOOD SUGAR DIAGNOSTIC
1 STRIP MISCELLANEOUS 3 TIMES DAILY
Qty: 100 EACH | Refills: 3 | Status: SHIPPED | OUTPATIENT
Start: 2022-06-14 | End: 2022-06-14 | Stop reason: SDUPTHER

## 2022-06-14 NOTE — TELEPHONE ENCOUNTER
Patient requested refill of test strips. Sent to [facility].     For Pharmacy Admin Tracking Only     Intervention Detail: Refill(s) Provided   Total # of Interventions Recommended: 1   Total # of Interventions Accepted: 1   Time Spent (min): 5

## 2022-06-21 ENCOUNTER — HOSPITAL ENCOUNTER (OUTPATIENT)
Dept: PHARMACY | Age: 57
Setting detail: THERAPIES SERIES
Discharge: HOME | End: 2022-06-21
Payer: COMMERCIAL

## 2022-06-21 PROCEDURE — 99211 OFF/OP EST MAY X REQ PHY/QHP: CPT

## 2022-06-21 NOTE — PROGRESS NOTES
Diabetic Medication Management Program  [facility] [place] - [facility]PATIENT  [address]. [facility], [address] [facility]  Phone: [phone]  Fax: [phone]    NAME: [address] RECORD NUMBER:  [id number]  AGE: 61 y.o. GENDER: female  : 1965  EPISODE DATE:  2022     Ms. Jennyfer Rojas was referred to [facility] by Vanessa Briscoe NP. Patient acknowledges working in consult agreement with clinical pharmacist and this provider.      Goals per referral:   Fasting blood glucose: < 130  Peak postprandial glucose: < 180  A1C: < 7      SUBJECTIVE     Ms. Jennyfer Rojas is a 61 y.o. female here for the Diabetes Service for self-management education, medication review including over the counter medications and herbal products, overall wellbeing assessment, transition of care and any needed adjustments with updates and recommendations communicated to the referring physician. Patient Findings:     Medication changes  no  Diet changes  no  Activity changes  no  Emergency Room Visit or Hospitalization  no  Acute Illness/new problems  no  Symptoms of hypoglycemia  no - none  Symptoms of hyperglycemia  no - none  Medication adverse reactions  none       OBJECTIVE     PMHx:    Past Medical History:   Diagnosis Date    [facility]     pre cancer cervix    Chronic pain of both shoulders 2018    Hyperlipidemia     Left sided sciatica 2018    Neuropathy     Type 2 diabetes mellitus ([facility]     Wears glasses        Social History:    Social History     Tobacco Use    Smoking status: Former Smoker     Packs/day: 2.00     Years: 6.00     Pack years: 12.00     Types: Cigarettes     Quit date: 1988     Years since quittin.9    Smokeless tobacco: Never Used   Substance Use Topics    Alcohol use:  Yes     Alcohol/week: 0.0 standard drinks     Comment: rare       Pertinent Labs:    Lab Results   Component Value Date    LABA1C 7.0 2022    LABA1C 7.3 2022    LABA1C 8.3 10/14/2021     Lab Results   Component Value Date    CHOL 207 01/24/2022    TRIG 147 01/24/2022    HDL 45 01/24/2022    LDLCALC 133 01/24/2022     Lab Results   Component Value Date    CREATININE 0.60 01/24/2022    BUN 13 01/24/2022     01/24/2022    K 4.6 01/24/2022     01/24/2022    CO2 31 01/24/2022     Lab Results   Component Value Date    ALT 17 01/24/2022       Weight:  Wt Readings from Last 3 Encounters:   05/11/22 190 lb (86.2 kg)   03/15/22 194 lb (88 kg)   01/19/22 200 lb 12.8 oz (91.1 kg)       Blood Pressure:  BP Readings from Last 3 Encounters:   05/11/22 112/76   03/15/22 121/76   01/19/22 126/78       Current medications:    Current Outpatient Medications:     ONETOUCH VERIO strip, Inject 1 each into the skin 3 times daily, Disp: 300 each, Rfl: 3    glimepiride (AMARYL) 4 MG tablet, Take 4 mg by mouth 2 times daily (before meals), Disp: , Rfl:     Continuous Blood Gluc Sensor (DEXCOM G6 SENSOR) MISC, Change every 10 days, Disp: 3 each, Rfl: 3    Continuous Blood Gluc Transmit (DEXCOM G6 TRANSMITTER) MISC, Change every 3 months, Disp: 1 each, Rfl: 3    atorvastatin (LIPITOR) 40 MG tablet, Take 1 tablet by mouth daily, Disp: 90 tablet, Rfl: 2    Continuous Blood Gluc Sensor (FREESTYLE PAPO 14 DAY SENSOR) MISC, 1 each by Does not apply route continuous, Disp: 3 each, Rfl: 3    vitamin D (CHOLECALCIFEROL) 25 MCG (1000 UT) TABS tablet, Take 1,000 Units by mouth daily, Disp: , Rfl:     blood glucose monitor supplies, DISPENSE NEW BG MONITOR, STRIPS, LANCETS AND LANCING DEVICE COVERED UNDER PATIENT INSURANCE.  TESTING UP TO TID, Disp: 100 each, Rfl: 3    Continuous Blood Gluc  (FREESTYLE PAPO 14 DAY READER) JOSEPH, 1 each by Does not apply route continuous, Disp: 3 each, Rfl: 3    XIGDUO XR  MG TB24, TAKE 1 TABLET BY MOUTH DAILY, Disp: 30 tablet, Rfl: 9    lisinopril (PRINIVIL;ZESTRIL) 2.5 MG tablet, Take 1 tab po qd, Disp: 90 tablet, Rfl: 3    pantoprazole (PROTONIX) 40 MG tablet, TAKE 1 TABLET BY MOUTH EVERY MORNING BEFORE BREAKFAST (Patient taking differently: Indications: as needed ), Disp: 90 tablet, Rfl: 0    Immunizations:   Most Recent Immunizations   Administered Date(s) Administered    COVID-19, Pfizer Purple top, DILUTE for use, 12+ yrs, 30mcg/0.3mL dose 08/18/2021    Influenza, Intradermal, Quadrivalent, Preservative Free 10/05/2017    Influenza, MDCK Quadv, IM, PF (Flucelvax 2 yrs and older) 10/14/2021    Influenza, MDCK Quadv, with preserv IM (Flucelvax 2 yrs and older) 09/24/2020    Influenza, Quadv, IM, (6 mo and older Fluzone, Flulaval, Fluarix and 3 yrs and older Afluria) 11/07/2016    Influenza, Quadv, IM, PF (6 mo and older Fluzone, Flulaval, Fluarix, and 3 yrs and older Afluria) 10/14/2019    Pneumococcal Polysaccharide ([id number]) 11/07/2016    Zoster Live (Zostavax) 10/10/2017       Home Blood Glucose Results: Patient did not bring blood glucose readings to appointment - unable to assess    Blood glucose trends noted: Patient states she has not been checking regularly. She had BG of 228 after dinner and 193 before breakfast as well as 226 after work likely due to missing meds. She attributes higher BG over the last 2 weeks to missing medication doses occasionally and taking glimepiride after meals rather than before. ASSESSMENT     Recent A1c: 7.0    Lab Results   Component Value Date    LABA1C 7.0 05/11/2022    LABA1C 7.3 01/19/2022    LABA1C 8.3 10/14/2021        Eating patterns: Typically her dinners consist of large salads, protein, and a small amount of potatoes as well as dessert. States she has protein with every meal and has eggs/miller/sausage with breakfast.    Exercise patterns: Patient has not been exercising as much as she had been lately. She would like to work on increasing activity by walking/biking a few times per week. Blood Glucose Testing: Testing occasionally.  I will investigate coverage for CGM    Current Diabetic Medications: Xigduo 10-1000mg daily, glimepiride 4mg BID     Diabetic Regimen/Compliance: missing occasional doses, taking glimepiride after meals (she will keep glimepiride on her kitchen table to help remember it before eating)     Other Medication Compliance: did not assess    Completed Journey for Control Session 2: \"Lets Talk About Diabetes and Healthy Eating\". Interactive discussion occurred regarding topics which include: Understanding Diabetes, Feelings About Food, Some Food Basics and What You Eat, How Much You Eat, When You Eat, Strategies for Healthy Eating, Challenges You Might Face, and Going for Your Goal and Your Support Network        PLAN     1. See below    Diabetic Action Plan is shown below. Patient and provider worked together to create goals which patient will work toward prior to the next appointment.         Physician Follow-up:  As scheduled    Medication Management Follow-up:   Diabetes Service  7/5/22 via phone    Electronically signed by Adelaide Huang [address] [place] [address] on 6/21/2022 at [place] [address] Intervention Detail:    Total # of Interventions Recommended: 0   Total # of Interventions Accepted: 0   Time Spent (min): 45

## 2022-06-23 RX ORDER — GLIMEPIRIDE 4 MG/1
TABLET ORAL
Qty: 180 TABLET | Refills: 0 | Status: SHIPPED | OUTPATIENT
Start: 2022-06-23 | End: 2022-08-16 | Stop reason: SDUPTHER

## 2022-07-05 ENCOUNTER — HOSPITAL ENCOUNTER (OUTPATIENT)
Dept: PHARMACY | Age: 57
Setting detail: THERAPIES SERIES
Discharge: HOME | End: 2022-07-05
Payer: COMMERCIAL

## 2022-07-05 PROCEDURE — 99211 OFF/OP EST MAY X REQ PHY/QHP: CPT

## 2022-07-05 NOTE — PROGRESS NOTES
Diabetic Medication Management Program  [address]. [address]  Phone: [phone]  Fax: [phone]    NAME: Danny Goff RECORD NUMBER:  [id number]  AGE: 61 y.o. GENDER: female  : 1965  EPISODE DATE:  2022     Ms. Colt Grant was referred to [facility] by Vinh Quiñonez NP. Patient acknowledges working in consult agreement with clinical pharmacist and this provider. Goals per referral:   Fasting blood glucose: < 130  Peak postprandial glucose: < 180  A1C: < 7    SUBJECTIVE     Ms. Colt Grant is a 61 y.o. female here for the Diabetes Service for self-management education, medication review including over the counter medications and herbal products, overall wellbeing assessment, transition of care and any needed adjustments with updates and recommendations communicated to the referring physician. Patient Findings:     Medication changes  no  Diet changes  no  Activity changes  Yes: trying to walk more - she has a goal to walk 10,000 steps daily. She easily meets it on work days but struggles on her days off  Emergency Room Visit or Hospitalization  no  Acute Illness/new problems  no  Symptoms of hypoglycemia  no - none  Symptoms of hyperglycemia  no - none  Medication adverse reactions  none    Comments: patient recently started using Dexcom and is enjoying the ease of monitoring and noticing  How certain foods affect her BG.      OBJECTIVE     PMHx:    Past Medical History:   Diagnosis Date    Cancer [facility])     pre cancer cervix    Chronic pain of both shoulders 2018    Hyperlipidemia     Left sided sciatica 2018    Neuropathy     Type 2 diabetes mellitus ([address]     Wears glasses        Social History:    Social History     Tobacco Use    Smoking status: Former Smoker     Packs/day: 2.00     Years: 6.00     Pack years: 12.00     Types: Cigarettes     Quit date: 1988     Years since quitting: 33.9    Smokeless tobacco: Never Used   Substance Use Topics    Alcohol use: Yes     Alcohol/week: 0.0 standard drinks     Comment: rare       Pertinent Labs:    Lab Results   Component Value Date    LABA1C 7.0 05/11/2022    LABA1C 7.3 01/19/2022    LABA1C 8.3 10/14/2021     Lab Results   Component Value Date    CHOL 207 01/24/2022    TRIG 147 01/24/2022    HDL 45 01/24/2022    LDLCALC 133 01/24/2022     Lab Results   Component Value Date    CREATININE 0.60 01/24/2022    BUN 13 01/24/2022     01/24/2022    K 4.6 01/24/2022     01/24/2022    CO2 31 01/24/2022     Lab Results   Component Value Date/Time    ALT 17 01/24/2022 12:00 AM       Weight:  Wt Readings from Last 3 Encounters:   05/11/22 190 lb (86.2 kg)   03/15/22 194 lb (88 kg)   01/19/22 200 lb 12.8 oz (91.1 kg)       Blood Pressure:  BP Readings from Last 3 Encounters:   05/11/22 112/76   03/15/22 121/76   01/19/22 126/78       Current medications:    Current Outpatient Medications:     glimepiride (AMARYL) 4 MG tablet, TAKE 1 TABLET TWICE A DAY BEFORE MEALS, Disp: 180 tablet, Rfl: 0    ONETOUCH VERIO strip, Inject 1 each into the skin 3 times daily, Disp: 300 each, Rfl: 3    Continuous Blood Gluc Sensor (DEXCOM G6 SENSOR) MISC, Change every 10 days, Disp: 3 each, Rfl: 3    Continuous Blood Gluc Transmit (DEXCOM G6 TRANSMITTER) MISC, Change every 3 months, Disp: 1 each, Rfl: 3    atorvastatin (LIPITOR) 40 MG tablet, Take 1 tablet by mouth daily, Disp: 90 tablet, Rfl: 2    Continuous Blood Gluc Sensor (FREESTYLE PAPO 14 DAY SENSOR) MISC, 1 each by Does not apply route continuous, Disp: 3 each, Rfl: 3    vitamin D (CHOLECALCIFEROL) 25 MCG (1000 UT) TABS tablet, Take 1,000 Units by mouth daily, Disp: , Rfl:     blood glucose monitor supplies, DISPENSE NEW BG MONITOR, STRIPS, LANCETS AND LANCING DEVICE COVERED UNDER PATIENT INSURANCE.  TESTING UP TO TID, Disp: 100 each, Rfl: 3    Continuous Blood Gluc  (FREESTYLE PAPO 14 DAY Compliance: did not assess    Completed Journey for Control Session 3: \"Let's Talk About Monitoring Your Blood Glucose\" Interactive discussion occurred regarding topics which include:Basics of Blood glucose, Blood Glucose Targers, Monitoring Blood Glucose at Home, Monitoring Blood glucose With Your Doctor, What Makes Blood Glucose Go Up and Down, Managing Low Blood Glucose, Managing High Blood Glucose, Using Your Results, and Going for Your Goal and Your Support Network     PLAN     1. Work on cutting down carbs in the evenings to help morning BG stay under goal  2. Increase activity     Diabetic Action Plan is shown below. Patient and provider worked together to create goals which patient will work toward prior to the next appointment.         Physician Follow-up:  As scheduled    Medication Management Follow-up:   Diabetes Service  7/19/22    Electronically signed by Jeannie Sorto [facility] on 7/5/2022 at 2:38 PM    For Pharmacy Admin Tracking Only     Intervention Detail:    Total # of Interventions Recommended: 0   Total # of Interventions Accepted: 0   Time Spent (min): 45

## 2022-07-19 ENCOUNTER — HOSPITAL ENCOUNTER (OUTPATIENT)
Dept: PHARMACY | Age: 57
Setting detail: THERAPIES SERIES
Discharge: HOME | End: 2022-07-19
Payer: COMMERCIAL

## 2022-07-19 DIAGNOSIS — E11.65 UNCONTROLLED TYPE 2 DIABETES MELLITUS WITH HYPERGLYCEMIA (HCC): Primary | ICD-10-CM

## 2022-07-19 PROCEDURE — 99212 OFFICE O/P EST SF 10 MIN: CPT

## 2022-07-19 NOTE — PROGRESS NOTES
Diabetic Medication Management Program  [facility] [place] [facility] - [facility]  [address]. [facility], [address]  Phone: [phone]  Fax: [phone]    NAME: Nicholas Benton RECORD NUMBER:  [id number]  AGE: 61 y.o. GENDER: female  : 1965  EPISODE DATE:  2022     Ms. Tricia Diaz was referred to [facility] by Steff Levy NP. Patient acknowledges working in consult agreement with clinical pharmacist and this provider. Goals per referral:   Fasting blood glucose: < 130  Peak postprandial glucose: < 180  A1C: < 7    SUBJECTIVE     Ms. Tricia Diaz is a 61 y.o. female here for the Diabetes Service for self-management education, medication review including over the counter medications and herbal products, overall wellbeing assessment, transition of care and any needed adjustments with updates and recommendations communicated to the referring physician. Patient Findings:     Medication changes  no  Diet changes  Yes: patient states she has been doing better with her low carb diet lately and less snacking at night  Activity changes  no  Emergency Room Visit or Hospitalization  no  Acute Illness/new problems  no  Symptoms of hypoglycemia  no - none  Symptoms of hyperglycemia  no - none  Medication adverse reactions  none    Comments: Patient started supplement from [address] called blood sugar support on 7/15 and she thinks it is helping to curb her appetite.  We did see an improvement on Dexcom reports from average of 166-149 before starting supplement so I praised patient on adherence to diabetic diet     OBJECTIVE     PMHx:    Past Medical History:   Diagnosis Date    Cancer ([address]     pre cancer cervix    Chronic pain of both shoulders 2018    Hyperlipidemia     Left sided sciatica 2018    Neuropathy     Type 2 diabetes mellitus ([facility])     Wears glasses        Social History:    Social History     Tobacco Use    Smoking status: Former     Packs/day: 2.00     Years: Disp: 100 each, Rfl: 3    Continuous Blood Gluc  (FREESTYLE PAPO 14 DAY READER) JOSEPH, 1 each by Does not apply route continuous, Disp: 3 each, Rfl: 3    XIGDUO XR  MG TB24, TAKE 1 TABLET BY MOUTH DAILY, Disp: 30 tablet, Rfl: 9    lisinopril (PRINIVIL;ZESTRIL) 2.5 MG tablet, Take 1 tab po qd, Disp: 90 tablet, Rfl: 3    pantoprazole (PROTONIX) 40 MG tablet, TAKE 1 TABLET BY MOUTH EVERY MORNING BEFORE BREAKFAST (Patient taking differently: Indications: as needed ), Disp: 90 tablet, Rfl: 0    Immunizations:   Most Recent Immunizations   Administered Date(s) Administered    COVID-19, PFIZER PURPLE top, DILUTE for use, (age 15 y+), 30mcg/0.3mL 08/18/2021    Influenza, Intradermal, Quadrivalent, Preservative Free 10/05/2017    Influenza, MDCK Quadv, IM, PF (Flucelvax 2 yrs and older) 10/14/2021    Influenza, MDCK Quadv, with preserv IM (Flucelvax 2 yrs and older) 09/24/2020    Influenza, Quadv, IM, (6 mo and older Fluzone, Flulaval, Fluarix and 3 yrs and older Afluria) 11/07/2016    Influenza, Quadv, IM, PF (6 mo and older Fluzone, Flulaval, Fluarix, and 3 yrs and older Afluria) 10/14/2019    Pneumococcal Polysaccharide ([id number]) 11/07/2016    Zoster Live (Zostavax) 10/10/2017       Home Blood Glucose Results: See below      Blood glucose trends noted:  Fasting BG  occasionally spikes in the early morning with no identified reason. Other than that her BG is staying in range most of the time     ASSESSMENT     Recent A1c: 7.0    Lab Results   Component Value Date    LABA1C 7.0 05/11/2022    LABA1C 7.3 01/19/2022    LABA1C 8.3 10/14/2021        Eating patterns: eating lower carb diet as discussed previously.  She does occasionally have a high carb bedtime snack    Exercise patterns: walking frequently     Blood Glucose Testing: Patient is currently using CGM    Current Diabetic Medications: Xigduo, glimepiride BID     Diabetic Regimen/Compliance: compliant     Other Medication Compliance: compliant      Completed Journey for Control Session 4 (Final Session): \"Continuing Your Journey with Diabetes\" Interactive discussion occurred regarding topics which include: The Natural Course of Diabetes, Keeping Your Blood Glucose on Target, Checking for Complications and Knowing Your ABCs, Understing Diabetes Medications, Daily Management of Diabetes, and Going for Your Goal and Your Support Network. PLAN     Patient would like to lose about 15lb - working toward goal weight of 170lb with diet and physical activity. Will give Shingrix and Tdap at next appt if covered by insurance    Diabetic Action Plan is shown below. Patient and provider worked together to create goals which patient will work toward prior to the next appointment.         Physician Follow-up:  As scheduled    Medication Management Follow-up:   Diabetes Service  8/16/22    Electronically signed by FRANKLYN Barrios [facility] on 7/19/2022 at 9:26 AM    For Pharmacy Admin Tracking Only    Intervention Detail: Vaccine Recommended/Administered  Total # of Interventions Recommended: 2  Total # of Interventions Accepted: 2  Time Spent (min): 60

## 2022-07-27 DIAGNOSIS — E11.65 UNCONTROLLED TYPE 2 DIABETES MELLITUS WITH HYPERGLYCEMIA (HCC): ICD-10-CM

## 2022-07-27 RX ORDER — DAPAGLIFLOZIN AND METFORMIN HYDROCHLORIDE 10; 1000 MG/1; MG/1
TABLET, FILM COATED, EXTENDED RELEASE ORAL
Qty: 90 TABLET | Refills: 1 | Status: SHIPPED | OUTPATIENT
Start: 2022-07-27 | End: 2022-08-16 | Stop reason: SDUPTHER

## 2022-07-27 RX ORDER — LISINOPRIL 2.5 MG/1
TABLET ORAL
Qty: 90 TABLET | Refills: 1 | Status: SHIPPED | OUTPATIENT
Start: 2022-07-27 | End: 2022-08-13 | Stop reason: SDUPTHER

## 2022-08-02 LAB
CHOLESTEROL, TOTAL: 148 MG/DL
CHOLESTEROL/HDL RATIO: 3.7
HDLC SERPL-MCNC: 40 MG/DL (ref 35–70)
LDL CHOLESTEROL CALCULATED: 91 MG/DL (ref 0–160)
NONHDLC SERPL-MCNC: 108 MG/DL
TRIGL SERPL-MCNC: 83 MG/DL
VLDLC SERPL CALC-MCNC: NORMAL MG/DL

## 2022-08-03 ENCOUNTER — OFFICE VISIT (OUTPATIENT)
Dept: FAMILY MEDICINE CLINIC | Age: 57
End: 2022-08-03
Payer: COMMERCIAL

## 2022-08-03 VITALS
TEMPERATURE: 97.5 F | HEART RATE: 66 BPM | SYSTOLIC BLOOD PRESSURE: 102 MMHG | DIASTOLIC BLOOD PRESSURE: 60 MMHG | OXYGEN SATURATION: 98 % | HEIGHT: 68 IN | WEIGHT: 187 LBS | BODY MASS INDEX: 28.34 KG/M2

## 2022-08-03 DIAGNOSIS — Z78.0 POSTMENOPAUSAL: ICD-10-CM

## 2022-08-03 DIAGNOSIS — Z12.31 ENCOUNTER FOR SCREENING MAMMOGRAM FOR BREAST CANCER: ICD-10-CM

## 2022-08-03 DIAGNOSIS — S76.311A STRAIN OF RIGHT HAMSTRING, INITIAL ENCOUNTER: Primary | ICD-10-CM

## 2022-08-03 DIAGNOSIS — T14.8XXA HEMATOMA: ICD-10-CM

## 2022-08-03 PROCEDURE — 99213 OFFICE O/P EST LOW 20 MIN: CPT | Performed by: PHYSICIAN ASSISTANT

## 2022-08-03 ASSESSMENT — ENCOUNTER SYMPTOMS
COLOR CHANGE: 0
BACK PAIN: 0

## 2022-08-03 ASSESSMENT — PATIENT HEALTH QUESTIONNAIRE - PHQ9
SUM OF ALL RESPONSES TO PHQ QUESTIONS 1-9: 0
SUM OF ALL RESPONSES TO PHQ QUESTIONS 1-9: 0
1. LITTLE INTEREST OR PLEASURE IN DOING THINGS: 0
SUM OF ALL RESPONSES TO PHQ9 QUESTIONS 1 & 2: 0
SUM OF ALL RESPONSES TO PHQ QUESTIONS 1-9: 0
2. FEELING DOWN, DEPRESSED OR HOPELESS: 0
SUM OF ALL RESPONSES TO PHQ QUESTIONS 1-9: 0

## 2022-08-03 NOTE — PROGRESS NOTES
Procedure Laterality Date    COLONOSCOPY      polyps removed x 3    COLPOSCOPY      DILATION AND CURETTAGE OF UTERUS      HYSTERECTOMY (CERVIX STATUS UNKNOWN)      complete    SHOULDER ARTHROSCOPY Left 2019    SHOULDER ARTHROSCOPY W/ROTATOR CUFF REPAIR performed by Justin Azevedo MD at [address] [place] [address] Left 2022    ORIF distal radius       Family History   Problem Relation Age of Onset    Asthma Mother     Obesity Mother     Diabetes Father     Cancer Father         prostate, skin, rectal    Diabetes Brother     Diabetes Paternal Aunt        Social History     Tobacco Use    Smoking status: Former     Packs/day: 2.00     Years: 6.00     Pack years: 12.00     Types: Cigarettes     Quit date: 1988     Years since quittin.0    Smokeless tobacco: Never   Substance Use Topics    Alcohol use: Yes     Alcohol/week: 0.0 standard drinks     Comment: rare      Current Outpatient Medications   Medication Sig Dispense Refill    lisinopril (PRINIVIL;ZESTRIL) 2.5 MG tablet TAKE 1 TABLET BY MOUTH EVERY DAY 90 tablet 1    XIGDUO XR  MG TB24 TAKE 1 TABLET BY MOUTH DAILY 90 tablet 1    glimepiride (AMARYL) 4 MG tablet TAKE 1 TABLET TWICE A DAY BEFORE MEALS 180 tablet 0    ONETOUCH VERIO strip Inject 1 each into the skin 3 times daily 300 each 3    Continuous Blood Gluc Sensor (DEXCOM G6 SENSOR) MISC Change every 10 days 3 each 3    Continuous Blood Gluc Transmit (DEXCOM G6 TRANSMITTER) MISC Change every 3 months 1 each 3    atorvastatin (LIPITOR) 40 MG tablet Take 1 tablet by mouth daily 90 tablet 2    Continuous Blood Gluc Sensor (FREESTYLE PAPO 14 DAY SENSOR) MISC 1 each by Does not apply route continuous 3 each 3    vitamin D (CHOLECALCIFEROL) 25 MCG (1000 UT) TABS tablet Take 1,000 Units by mouth daily      blood glucose monitor supplies DISPENSE NEW BG MONITOR, STRIPS, LANCETS AND LANCING DEVICE COVERED UNDER PATIENT INSURANCE.  TESTING UP TO  each 3    Continuous Blood Gluc  ("[employer]" PAPO 14 DAY READER) JOSEPH 1 each by Does not apply route continuous 3 each 3    pantoprazole (PROTONIX) 40 MG tablet TAKE 1 TABLET BY MOUTH EVERY MORNING BEFORE BREAKFAST (Patient taking differently: Indications: as needed) 90 tablet 0     No current facility-administered medications for this visit. No Known Allergies    Health Maintenance   Topic Date Due    Hepatitis B vaccine (1 of 3 - Risk 3-dose series) Never done    DTaP/Tdap/Td vaccine (1 - Tdap) Never done    Pneumococcal 0-64 years Vaccine (2 - PCV) 11/07/2017    Shingles vaccine (2 of 3) 12/05/2017    Diabetic foot exam  12/17/2021    COVID-19 Vaccine (3 - Booster for Pfizer series) 01/18/2022    Diabetic microalbuminuria test  07/08/2022    Depression Screen  07/08/2022    Diabetic retinal exam  08/25/2022    Flu vaccine (1) 09/01/2022    Breast cancer screen  09/09/2022    Colorectal Cancer Screen  11/18/2022    Lipids  01/24/2023    A1C test (Diabetic or Prediabetic)  05/11/2023    Hepatitis C screen  Completed    HIV screen  Completed    Hepatitis A vaccine  Aged Out    Hib vaccine  Aged Out    Meningococcal (ACWY) vaccine  Aged Out       Subjective:     Review of Systems   Constitutional:  Negative for activity change, appetite change, fatigue and fever. Musculoskeletal:  Positive for myalgias. Negative for arthralgias, back pain, gait problem and joint swelling. Skin:  Negative for color change, pallor, rash and wound. Neurological:  Negative for weakness and numbness. Hematological:  Negative for adenopathy. Psychiatric/Behavioral:  Positive for sleep disturbance. The patient is not nervous/anxious. Objective:     Physical Exam  Vitals and nursing note reviewed. Constitutional:       General: She is not in acute distress. Appearance: Normal appearance. She is well-developed. She is not ill-appearing. HENT:      Head: Normocephalic and atraumatic.    Musculoskeletal:      Right hip: No deformity, lacerations or tenderness. Normal range of motion. Right upper leg: Tenderness (tenderness mid to lower upper posterior leg on the right. ecchymosis purple/yellow in color to mid calf) present. Right knee: No swelling or deformity. Normal range of motion. No tenderness. Legs:    Skin:     General: Skin is warm and dry. Coloration: Skin is not pale. Findings: No erythema or rash. Neurological:      Mental Status: She is alert and oriented to person, place, and time. Motor: No weakness. Gait: Gait normal.   Psychiatric:         Mood and Affect: Mood normal.         Behavior: Behavior normal.         Thought Content: Thought content normal.         Judgment: Judgment normal.     /60 (Site: Left Upper Arm, Position: Sitting, Cuff Size: Large Adult)   Pulse 66   Temp 97.5 °F (36.4 °C) (Tympanic)   Ht 5' 8\" (1.727 m)   Wt 187 lb (84.8 kg)   SpO2 98%   BMI 28.43 kg/m²     Assessment:       Diagnosis Orders   1. Strain of right hamstring, initial encounter        2. Postmenopausal  DEXA BONE DENSITY 2 SITES      3. Hematoma        4. Encounter for screening mammogram for breast cancer  MILES DIGITAL SCREEN W OR WO CAD BILATERAL                Plan:      Return if symptoms worsen or fail to improve. Urged patient to continue with ice over hematoma and NSAID use. Continue to work on stretching. We discussed yoga/Pilates to improve core strength for improved gait. She declined any further physical therapy at this point. She did do some following her surgery for left wrist fracture earlier. I did give her an order for an annual mammogram and DEXA scan at this time.   Follow-up if any further concerns  Patient agreed with treatment plan    Orders Placed This Encounter   Procedures    DEXA BONE DENSITY 2 SITES     Standing Status:   Future     Standing Expiration Date:   8/3/2023     Order Specific Question:   Reason for exam:     Answer:   screening    MILES DIGITAL SCREEN W OR WO CAD BILATERAL     Standing Status:   Future     Standing Expiration Date:   8/3/2023     Order Specific Question:   Reason for exam:     Answer:   screening     No orders of the defined types were placed in this encounter. Patient given educational materials - see patient instructions. Discussed use, benefit, and side effects of prescribed medications. All patientquestions answered. Pt voiced understanding. Reviewed health maintenance. Instructedto continue current medications, diet and exercise. Patient agreed with treatmentplan. Follow up as directed. Please note that this chart was generated using voice recognition Dragon dictation software. Although every effort was made to ensure the accuracy of this automated transcription, some errors in transcription may have occurred.      Electronically signed by Carol Pierson PA-C on 8/3/2022 at 12:36 PM

## 2022-08-03 NOTE — PROGRESS NOTES
Visit Information    Have you changed or started any medications since your last visit including any over-the-counter medicines, vitamins, or herbal medicines? no   Are you having any side effects from any of your medications? -  no  Have you stopped taking any of your medications? Is so, why? -  no    Have you seen any other physician or provider since your last visit? No  Have you had any other diagnostic tests since your last visit? No  Have you been seen in the emergency room and/or had an admission to a hospital since we last saw you? No  Have you had your routine dental cleaning in the past 6 months? no    Have you activated your [employer] account? If not, what are your barriers?  Yes     Patient Care Team:  LORNA Tripp CNP as PCP - General (Nurse Practitioner)  LORNA Tripp CNP as PCP - [facility] Provider    Medical History Review  Past Medical, Family, and Social History reviewed and  contribute to the patient presenting condition    Health Maintenance   Topic Date Due    Hepatitis B vaccine (1 of 3 - Risk 3-dose series) Never done    DTaP/Tdap/Td vaccine (1 - Tdap) Never done    Pneumococcal 0-64 years Vaccine (2 - PCV) 11/07/2017    Shingles vaccine (2 of 3) 12/05/2017    Diabetic foot exam  12/17/2021    COVID-19 Vaccine (3 - Booster for Pfizer series) 01/18/2022    Diabetic microalbuminuria test  07/08/2022    Depression Screen  07/08/2022    Diabetic retinal exam  08/25/2022    Flu vaccine (1) 09/01/2022    Breast cancer screen  09/09/2022    Colorectal Cancer Screen  11/18/2022    Lipids  01/24/2023    A1C test (Diabetic or Prediabetic)  05/11/2023    Hepatitis C screen  Completed    HIV screen  Completed    Hepatitis A vaccine  Aged Out    Hib vaccine  Aged Out    Meningococcal (ACWY) vaccine  Aged Out

## 2022-08-13 RX ORDER — LISINOPRIL 2.5 MG/1
TABLET ORAL
Qty: 90 TABLET | Refills: 1 | Status: SHIPPED | OUTPATIENT
Start: 2022-08-13

## 2022-08-13 RX ORDER — BLOOD-GLUCOSE SENSOR
EACH MISCELLANEOUS
Qty: 3 EACH | Refills: 3 | Status: SHIPPED | OUTPATIENT
Start: 2022-08-13

## 2022-08-16 ENCOUNTER — TELEPHONE (OUTPATIENT)
Dept: PHARMACY | Age: 57
End: 2022-08-16

## 2022-08-16 DIAGNOSIS — E11.65 UNCONTROLLED TYPE 2 DIABETES MELLITUS WITH HYPERGLYCEMIA (HCC): ICD-10-CM

## 2022-08-16 RX ORDER — GLIMEPIRIDE 4 MG/1
TABLET ORAL
Qty: 180 TABLET | Refills: 3 | Status: SHIPPED | OUTPATIENT
Start: 2022-08-16 | End: 2022-08-30 | Stop reason: ALTCHOICE

## 2022-08-16 RX ORDER — BLOOD-GLUCOSE TRANSMITTER
EACH MISCELLANEOUS
Qty: 1 EACH | Refills: 3 | Status: SHIPPED | OUTPATIENT
Start: 2022-08-16

## 2022-08-16 RX ORDER — DAPAGLIFLOZIN AND METFORMIN HYDROCHLORIDE 10; 1000 MG/1; MG/1
TABLET, FILM COATED, EXTENDED RELEASE ORAL
Qty: 90 TABLET | Refills: 3 | Status: SHIPPED | OUTPATIENT
Start: 2022-08-16

## 2022-08-16 NOTE — TELEPHONE ENCOUNTER
Sent in refills for [facility] per patient request. She forgot about her in person appt today so I rescheduled for 8/30    For Pharmacy Admin Tracking Only    Intervention Detail: Refill(s) Provided  Total # of Interventions Recommended: 3  Total # of Interventions Accepted: 3  Time Spent (min): 15

## 2022-08-30 ENCOUNTER — HOSPITAL ENCOUNTER (OUTPATIENT)
Dept: PHARMACY | Age: 57
Setting detail: THERAPIES SERIES
Discharge: HOME | End: 2022-08-30
Payer: COMMERCIAL

## 2022-08-30 LAB — HBA1C MFR BLD: 7.1 %

## 2022-08-30 PROCEDURE — 83036 HEMOGLOBIN GLYCOSYLATED A1C: CPT

## 2022-08-30 PROCEDURE — 99212 OFFICE O/P EST SF 10 MIN: CPT

## 2022-08-30 RX ORDER — PIOGLITAZONEHYDROCHLORIDE 15 MG/1
15 TABLET ORAL DAILY
COMMUNITY
End: 2022-10-04 | Stop reason: SDUPTHER

## 2022-08-30 NOTE — PROGRESS NOTES
Diabetic Medication Management Program  [address]. [facility], [address] [facility]  Phone: [phone]  Fax: [phone]    NAME: Krissy Chang RECORD NUMBER:  [id number]  AGE: 61 y.o. GENDER: female  : 1965  EPISODE DATE:  2022     Ms. Linda Alvarez was referred to [facility] Medication Management Services by Yeni Diego NP. Patient acknowledges working in consult agreement with clinical pharmacist and this provider. Goals per referral:   Fasting blood glucose: < 130  Peak postprandial glucose: < 180  A1C: < 7    SUBJECTIVE     Ms. Linda Alvarez is a 61 y.o. female here for the Diabetes Service for self-management education, medication review including over the counter medications and herbal products, overall wellbeing assessment, transition of care and any needed adjustments with updates and recommendations communicated to the referring physician. Patient Findings:     Medication changes  no  Diet changes  Yes: watching carbs especially in the evening  Activity changes  no  Emergency Room Visit or Hospitalization  no  Acute Illness/new problems  no  Symptoms of hypoglycemia  no - none  Symptoms of hyperglycemia  no - none  Medication adverse reactions  none        OBJECTIVE     PMHx:    Past Medical History:   Diagnosis Date    Cancer ([facility] [address]     pre cancer cervix    Chronic pain of both shoulders 2018    Hyperlipidemia     Left sided sciatica 2018    Neuropathy     Type 2 diabetes mellitus (HCC)     Wears glasses        Social History:    Social History     Tobacco Use    Smoking status: Former     Packs/day: 2.00     Years: 6.00     Pack years: 12.00     Types: Cigarettes     Quit date: 1988     Years since quittin.1    Smokeless tobacco: Never   Substance Use Topics    Alcohol use:  Yes     Alcohol/week: 0.0 standard drinks     Comment: rare       Pertinent Labs:    Lab Results   Component Value Date    LABA1C 7.1 2022    LABA1C 7.0 05/11/2022    LABA1C 7.3 01/19/2022     Lab Results   Component Value Date    CHOL 207 01/24/2022    TRIG 147 01/24/2022    HDL 45 01/24/2022    LDLCALC 133 01/24/2022     Lab Results   Component Value Date    CREATININE 0.60 01/24/2022    BUN 13 01/24/2022     01/24/2022    K 4.6 01/24/2022     01/24/2022    CO2 31 01/24/2022     Lab Results   Component Value Date/Time    ALT 17 01/24/2022 12:00 AM       Weight:  Wt Readings from Last 3 Encounters:   08/03/22 187 lb (84.8 kg)   05/11/22 190 lb (86.2 kg)   03/15/22 194 lb (88 kg)       Blood Pressure:  BP Readings from Last 3 Encounters:   08/03/22 102/60   05/11/22 112/76   03/15/22 121/76       Current medications:    Current Outpatient Medications:     Continuous Blood Gluc Transmit (DEXCOM G6 TRANSMITTER) MISC, Change every 3 months, Disp: 1 each, Rfl: 3    Dapagliflozin-metFORMIN HCl ER (XIGDUO XR)  MG TB24, TAKE 1 TABLET BY MOUTH DAILY, Disp: 90 tablet, Rfl: 3    lisinopril (PRINIVIL;ZESTRIL) 2.5 MG tablet, TAKE 1 TABLET BY MOUTH EVERY DAY, Disp: 90 tablet, Rfl: 1    Continuous Blood Gluc Sensor (DEXCOM G6 SENSOR) MISC, Change every 10 days, Disp: 3 each, Rfl: 3    ONETOUCH VERIO strip, Inject 1 each into the skin 3 times daily, Disp: 300 each, Rfl: 3    atorvastatin (LIPITOR) 40 MG tablet, Take 1 tablet by mouth daily, Disp: 90 tablet, Rfl: 2    vitamin D (CHOLECALCIFEROL) 25 MCG (1000 UT) TABS tablet, Take 1,000 Units by mouth daily, Disp: , Rfl:     blood glucose monitor supplies, DISPENSE NEW BG MONITOR, STRIPS, LANCETS AND LANCING DEVICE COVERED UNDER PATIENT INSURANCE.  TESTING UP TO TID, Disp: 100 each, Rfl: 3    pantoprazole (PROTONIX) 40 MG tablet, TAKE 1 TABLET BY MOUTH EVERY MORNING BEFORE BREAKFAST (Patient taking differently: Indications: as needed), Disp: 90 tablet, Rfl: 0    Immunizations:   Most Recent Immunizations   Administered Date(s) Administered    COVID-19, PFIZER PURPLE top, DILUTE for use, (age 15 y+), 30mcg/0.3mL 08/18/2021    INFLUENZA, INTRADERMAL, QUADRIVALENT, PRESERVATIVE FREE 10/05/2017    Influenza, AFLURIA, FLUZONE (age 1 y+), MDV 11/07/2016    Influenza, FLUARIX, FLULAVAL, (age 10 mo+) AND AFLURIA, FLUZONE (age 1 y+), PF 10/14/2019    Influenza, FLUCELVAX, (age 10 mo+), MDCK, MDV 09/24/2020    Influenza, FLUCELVAX, (age 10 mo+), MDCK, PF 10/14/2021    Pneumococcal Polysaccharide ([id number]) 11/07/2016    Zoster Live (Zostavax) 10/10/2017       Home Blood Glucose Results: See below      Blood glucose trends noted:  Average  which is slightly higher than last average of 149 noted in July. However time in range is in goal at 80% and GMI is 7.1%    ASSESSMENT     Recent A1c: Done in office today - 7.1% which is just above goal but stable since last check    Lab Results   Component Value Date    LABA1C 7.1 08/30/2022    LABA1C 7.0 05/11/2022    LABA1C 7.3 01/19/2022        Eating patterns: eating lower carb diet as discussed previously    Exercise patterns: walking frequently. She was thinking about joining the gym but it is far from her home. She is also unable to stream workouts as she does not have internet. Will discuss possibility of workout DVDs or just workouts that can be followed via handouts at next appt. Blood Glucose Testing: Patient is currently using CGM    Current Diabetic Medications: Xigduo daily, Gimepiride 4mg BID before meals     Diabetic Regimen/Compliance: compliant     Other Medication Compliance: compliant       PLAN     Switch from glimepiride 4mg BID to Actos to target insulin resistance rather than simply increasing insulin blood levels - will initiate 15mg daily then increase to 30mg daily after 1-2 weeks if tolerated. Urine microalbumin ordered, awaiting results  Shingrix administered at appt, dose #2 due 10/30/22-2/28/23    Diabetic Action Plan is shown below.  Patient and provider worked together to create goals which patient will work toward prior to the next appointment.         Physician Follow-up:  As scheduled    Medication Management Follow-up:   Diabetes Service  Phone call on 10/11/22 to check on Actos therapy then schedule next A1c after 11/30/22    Electronically signed by Alexx Palma, [address] [place] [address] on 8/30/2022 at 3:49 PM    For Pharmacy Admin Tracking Only    Intervention Detail: New Rx: 1, reason: Patient Preference and Vaccine Recommended/Administered  Total # of Interventions Recommended: 2  Total # of Interventions Accepted: 2  Time Spent (min): 60

## 2022-08-31 VITALS
DIASTOLIC BLOOD PRESSURE: 70 MMHG | WEIGHT: 186 LBS | BODY MASS INDEX: 28.28 KG/M2 | SYSTOLIC BLOOD PRESSURE: 112 MMHG | HEART RATE: 85 BPM

## 2022-10-04 ENCOUNTER — TELEPHONE (OUTPATIENT)
Dept: PHARMACY | Age: 57
End: 2022-10-04

## 2022-10-04 RX ORDER — PIOGLITAZONEHYDROCHLORIDE 15 MG/1
15 TABLET ORAL DAILY
Qty: 30 TABLET | Refills: 2 | Status: SHIPPED | OUTPATIENT
Start: 2022-10-04

## 2022-10-04 NOTE — TELEPHONE ENCOUNTER
Patient called stating pharmacy filled glimepiride instead of Actos. I sent in new RX for Actos 15mg daily with note to cancel glimepiride.      For Pharmacy Admin Tracking Only    Intervention Detail: Refill(s) Provided  Total # of Interventions Recommended: 1  Total # of Interventions Accepted: 1  Time Spent (min): 5

## 2022-10-10 RX ORDER — ATORVASTATIN CALCIUM 40 MG/1
TABLET, FILM COATED ORAL
Qty: 90 TABLET | Refills: 3 | Status: SHIPPED | OUTPATIENT
Start: 2022-10-10

## 2022-10-11 ENCOUNTER — HOSPITAL ENCOUNTER (OUTPATIENT)
Dept: PHARMACY | Age: 57
Setting detail: THERAPIES SERIES
Discharge: HOME | End: 2022-10-11
Payer: COMMERCIAL

## 2022-10-11 PROCEDURE — 99211 OFF/OP EST MAY X REQ PHY/QHP: CPT

## 2022-10-11 NOTE — PROGRESS NOTES
Diabetic Medication Management Program  [address]  [address] [place] [address]. [place], [address] [place] [address]  Phone: [phone]  Fax: [phone]    NAME: Valera Seip RECORD NUMBER:  [id number]  AGE: 61 y.o. GENDER: female  : 1965  EPISODE DATE:  10/11/2022     Ms. Rossi was referred to [facility] by Claude Cartwright NP. Patient acknowledges working in consult agreement with clinical pharmacist and this provider. Goals per referral:   Fasting blood glucose: < 130  Peak postprandial glucose: < 180  A1C: < 7    This visit was conducted via phone    SUBJECTIVE     Ms. Rossi is a 61 y.o. female here for the Diabetes Service for self-management education, medication review including over the counter medications and herbal products, overall wellbeing assessment, transition of care and any needed adjustments with updates and recommendations communicated to the referring physician. Patient Findings:     Medication changes  no  Diet changes  no  Activity changes  no  Emergency Room Visit or Hospitalization  no  Acute Illness/new problems  no  Symptoms of hypoglycemia  no - none  Symptoms of hyperglycemia  no - none  Medication adverse reactions  none    Comments: Patient was supposed to switch to Actos after last appt. She thought she had some at home as she was on this medication previously but she did not. She has been on glimepiride 4mg BID instead. She called last week and requested that I call in Actos for her which I did. There was then a delay with the pharmacy as they were waiting to get a 90 day supply authorized. After speaking with pharmacy and authorizing this 90 day supply they state they will fill it for patient.       OBJECTIVE     PMHx:    Past Medical History:   Diagnosis Date    Cancer ([facility] [address]     pre cancer cervix    Chronic pain of both shoulders 2018    Hyperlipidemia     Left sided sciatica 2018    Neuropathy     [facility]     Wears glasses        Social History:    Social History     Tobacco Use    Smoking status: Former     Packs/day: 2.00     Years: 6.00     Pack years: 12.00     Types: Cigarettes     Quit date: 1988     Years since quittin.2    Smokeless tobacco: Never   Substance Use Topics    Alcohol use:  Yes     Alcohol/week: 0.0 standard drinks     Comment: rare       Pertinent Labs:    Lab Results   Component Value Date    LABA1C 7.1 2022    LABA1C 7.0 2022    LABA1C 7.3 2022     Lab Results   Component Value Date    CHOL 148 2022    TRIG 83 2022    HDL 40 2022    LDLCALC 91 2022     Lab Results   Component Value Date    CREATININE 0.60 2022    BUN 13 2022     2022    K 4.6 2022     2022    CO2 31 2022     Lab Results   Component Value Date/Time    ALT 17 2022 12:00 AM       Weight:  Wt Readings from Last 3 Encounters:   22 186 lb (84.4 kg)   22 187 lb (84.8 kg)   22 190 lb (86.2 kg)       Blood Pressure:  BP Readings from Last 3 Encounters:   22 112/70   22 102/60   22 112/76       Current medications:    Current Outpatient Medications:     atorvastatin (LIPITOR) 40 MG tablet, TAKE 1 TABLET DAILY, Disp: 90 tablet, Rfl: 3    pioglitazone (ACTOS) 15 MG tablet, Take 1 tablet by mouth daily Please discontinue glimepiride prescription - patient no longer takes, Disp: 30 tablet, Rfl: 2    Continuous Blood Gluc Transmit (DEXCOM G6 TRANSMITTER) MISC, Change every 3 months, Disp: 1 each, Rfl: 3    Dapagliflozin-metFORMIN HCl ER (XIGDUO XR)  MG TB24, TAKE 1 TABLET BY MOUTH DAILY, Disp: 90 tablet, Rfl: 3    lisinopril (PRINIVIL;ZESTRIL) 2.5 MG tablet, TAKE 1 TABLET BY MOUTH EVERY DAY, Disp: 90 tablet, Rfl: 1    Continuous Blood Gluc Sensor (DEXCOM G6 SENSOR) MISC, Change every 10 days, Disp: 3 each, Rfl: 3    ONETOUCH VERIO strip, Inject 1 each into the skin 3 times daily, Disp: 300 each, Rfl: 3    vitamin D (CHOLECALCIFEROL) 25 MCG (1000 UT) TABS tablet, Take 1,000 Units by mouth daily, Disp: , Rfl:     blood glucose monitor supplies, DISPENSE NEW BG MONITOR, STRIPS, LANCETS AND LANCING DEVICE COVERED UNDER PATIENT INSURANCE.  TESTING UP TO TID, Disp: 100 each, Rfl: 3    pantoprazole (PROTONIX) 40 MG tablet, TAKE 1 TABLET BY MOUTH EVERY MORNING BEFORE BREAKFAST (Patient taking differently: Indications: as needed), Disp: 90 tablet, Rfl: 0    Immunizations:   Most Recent Immunizations   Administered Date(s) Administered    COVID-19, PFIZER PURPLE top, DILUTE for use, (age 15 y+), 30mcg/0.3mL 08/18/2021    INFLUENZA, INTRADERMAL, QUADRIVALENT, PRESERVATIVE FREE 10/05/2017    Influenza, AFLURIA (age 1 yrs+), FLUZONE, (age 10 mo+), MDV, 0.5mL 11/07/2016    Influenza, FLUARIX, FLULAVAL, FLUZONE (age 10 mo+) AND AFLURIA, (age 1 y+), PF, 0.5mL 10/14/2019    Influenza, FLUCELVAX, (age 10 mo+), MDCK, MDV, 0.5mL 09/24/2020    Influenza, FLUCELVAX, (age 10 mo+), MDCK, PF, 0.5mL 10/14/2021    Pneumococcal Polysaccharide ([id number]) 11/07/2016    Zoster Live (Zostavax) 10/10/2017    Zoster Recombinant (Shingrix) 08/30/2022       Home Blood Glucose Results: See below      Blood glucose trends noted:  Average BG at goal, time in range over 70%    ASSESSMENT     Recent A1c: 7.1    Lab Results   Component Value Date    LABA1C 7.1 08/30/2022    LABA1C 7.0 05/11/2022    LABA1C 7.3 01/19/2022        Eating patterns: No change, she states she is eating a lower carb diet and meeting her goal of < 45 grams per meal most of the time    Exercise patterns: active with walking and swimming     Blood Glucose Testing: Patient is currently using CGM    Current Diabetic Medications: glimepiride 4mg BID, Xidguo     Diabetic Regimen/Compliance: compliant     Other Medication Compliance: compliant      PLAN     Switch to Actos when received from pharmacy and stop glimepiride  Continue healthy eating and exercise  A1c and shingrix at next appt  Patient will do urine microalbumin before next appt - informed her she can go to any [facility] lab so she will [facility] to [facility] when she sees her daughter next     Diabetic Action Plan is shown below. Patient and provider worked together to create goals which patient will work toward prior to the next appointment.         Physician Follow-up:  As scheduled    Medication Management Follow-up:   Diabetes Service  12/6/22    Electronically signed by Iván Delgado [facility] on 10/11/2022 at 9:35 AM    For Pharmacy Admin Tracking Only    Intervention Detail: Refill(s) Provided  Total # of Interventions Recommended: 1  Total # of Interventions Accepted: 1  Time Spent (min): 45

## 2022-11-08 ENCOUNTER — HOSPITAL ENCOUNTER (OUTPATIENT)
Age: 57
Discharge: HOME | End: 2022-11-08
Payer: COMMERCIAL

## 2022-11-08 ENCOUNTER — HOSPITAL ENCOUNTER (OUTPATIENT)
Dept: MAMMOGRAPHY | Age: 57
Discharge: HOME | End: 2022-11-10
Payer: COMMERCIAL

## 2022-11-08 DIAGNOSIS — Z78.0 POSTMENOPAUSAL: ICD-10-CM

## 2022-11-08 DIAGNOSIS — E11.65 UNCONTROLLED TYPE 2 DIABETES MELLITUS WITH HYPERGLYCEMIA (HCC): ICD-10-CM

## 2022-11-08 DIAGNOSIS — Z12.31 ENCOUNTER FOR SCREENING MAMMOGRAM FOR BREAST CANCER: ICD-10-CM

## 2022-11-08 LAB
CREATININE URINE: 36.2 MG/DL (ref 28–217)
MICROALBUMIN/CREAT 24H UR: <12 MG/L
MICROALBUMIN/CREAT UR-RTO: NORMAL MCG/MG CREAT

## 2022-11-08 PROCEDURE — 82043 UR ALBUMIN QUANTITATIVE: CPT

## 2022-11-08 PROCEDURE — 77063 BREAST TOMOSYNTHESIS BI: CPT

## 2022-11-08 PROCEDURE — 82570 ASSAY OF URINE CREATININE: CPT

## 2022-11-08 PROCEDURE — 77080 DXA BONE DENSITY AXIAL: CPT

## 2022-11-09 ENCOUNTER — TELEPHONE (OUTPATIENT)
Dept: PHARMACY | Age: 57
End: 2022-11-09

## 2022-11-09 NOTE — TELEPHONE ENCOUNTER
Patient called concerned with elevated blood glucose levels and states she took a Glimepiride this mronign along with her Actos 15mg since the last couple of weeks trends have been elevated despite usual eating habits. She states she has kept carbs within recommended limits for the most part. Advised we can continue Actos 15mg with breakfast and then split Glimepiride 2mg with breakfast and 2mg with dinner with potential to increase to 4mg BID. Will log in to Dexcom to assess trends Friday and reach out to patient with further recommendation.

## 2022-11-11 ENCOUNTER — HOSPITAL ENCOUNTER (OUTPATIENT)
Dept: PHARMACY | Age: 57
Setting detail: THERAPIES SERIES
Discharge: HOME | End: 2022-11-11

## 2022-11-11 NOTE — PROGRESS NOTES
Reviewed Dexcom trends since 11/9 when patient started Actos 15mg with breakfast and Glimepiride 2mg with breakfast and dinner. LVM with patient to continue this throught the weekend and we will assess trends on Monday and possibly increase Glimepiride dose at that point.

## 2022-12-01 ENCOUNTER — TELEPHONE (OUTPATIENT)
Dept: PHARMACY | Age: 57
End: 2022-12-01

## 2022-12-01 RX ORDER — METFORMIN HYDROCHLORIDE 1000 MG/1
1000 TABLET, FILM COATED, EXTENDED RELEASE ORAL DAILY
Qty: 90 TABLET | Refills: 1 | Status: SHIPPED | OUTPATIENT
Start: 2022-12-01

## 2022-12-01 NOTE — TELEPHONE ENCOUNTER
Patient called stating her coupon for Elayne Rm is no longer active and it appears that the drug company has discontinued copay cards for this drug. We will send new prescriptions for agents separately to pharmacy and provide patient with info to obtain copay card for Re[facility]leen. Patient states BG has been a bit higher lately due to dietary intake over the holiday but would like to keep with the 2mg Glimepiride twice daily for now.   Will assess at upcoming appointment CGM trends to determine if evening Glimepiride dose increase to 4mg would be beneficial.

## 2023-11-06 NOTE — H&P (VIEW-ONLY)
HISTORY and [id number]       NAME:  Ramón Gaston  MRN: [id number]   YOB: 1965   Date: 5/13/2019   Age: 46 y.o. Gender: female       COMPLAINT AND PRESENT HISTORY:     Ramón Gaston is 46 y.o.,  female, undergoing preadmission testing for Left Shoulder Arthroscopy w/Rotator Cuff Repair. Patient is unable to recall specific injury to the shoulder. Pt states she works at [employer] and assumes that it could be related to home projects or wear and tear. Pt is right handed. No recent falls or trauma. No redness, swelling or rashes. Pt C/O of pain, weakness, stiffness, popping/ cracking and limitation in the range of motion (Abduction to 90 Degrees). Pt reports the pain as soreness, aching 2/10 in severity. She will take ibuprofen \"once in a while,\" if needed. Pt denies any other symptoms. No Hx of MRSA infections in the past.    Narrative   EXAMINATION:   MRI OF THE LEFT SHOULDER WITHOUT CONTRAST   4/29/2019 9:37 am       TECHNIQUE:   Multiplanar multisequence MRI of the left shoulder was performed without the   administration of intravenous contrast.       COMPARISON:   None.       HISTORY:   ORDERING SYSTEM PROVIDED HISTORY: Left shoulder pain, unspecified chronicity   TECHNOLOGIST PROVIDED HISTORY:   left shoulder pain x 3-4 months, abnormal xray   Ordering Physician Provided Reason for Exam:  Pt c/o lt shoulder pain,   shoulder popped 10 wks ago, failed P.T. Acuity: Chronic   Type of Exam: Ongoing       FINDINGS:   ROTATOR CUFF: Full-thickness, complete tear of the supraspinatus tendon   spanning 2.1 cm AP dimension with approximately 1.3 cm of retraction.    Infraspinatus tendinopathy and subtle intrasubstance tearing.  Subscapularis   and teres minor tendons appear intact.       BICEPS TENDON: The biceps tendon is ruptured with retraction into the upper   arm.       LABRUM: No discrete labral tear identified.  Articular cartilage is preserved   within the Smokeless tobacco: Never Used   Substance and Sexual Activity    Alcohol use: Yes     Alcohol/week: 0.0 oz     Comment: rare    Drug use: No    Sexual activity: Yes     Partners: Male   Lifestyle    Physical activity:     Days per week: None     Minutes per session: None    Stress: None   Relationships    Social connections:     Talks on phone: None     Gets together: None     Attends [employer] service: None     Active member of club or organization: None     Attends meetings of clubs or organizations: None     Relationship status: None    Intimate partner violence:     Fear of current or ex partner: None     Emotionally abused: None     Physically abused: None     Forced sexual activity: None   Other Topics Concern    None   Social History Narrative    None           REVIEW OF SYSTEMS      No Known Allergies    Current Outpatient Medications on File Prior to Encounter   Medication Sig Dispense Refill    metFORMIN (GLUCOPHAGE) 1000 MG tablet Take 1,000 mg by mouth 2 times daily (with meals)      SITagliptin (JANUVIA) 100 MG tablet Take 100 mg by mouth daily      glimepiride (AMARYL) 4 MG tablet Take 1 tablet by mouth every morning (before breakfast) 90 tablet 1    lisinopril (ZESTRIL) 2.5 MG tablet Take 1 tablet by mouth daily 30 tablet 5    meloxicam (MOBIC) 15 MG tablet Take 1 tablet by mouth daily 30 tablet 5    atorvastatin (LIPITOR) 20 MG tablet Take 1 tablet by mouth daily 30 tablet 5    vitamin D (ERGOCALCIFEROL) 23239 UNITS CAPS capsule Take 1 capsule by mouth once a week for 8 doses 8 capsule 0    SITagliptin-metFORMIN HCl -1000 MG TB24 Take 1 tablet by mouth daily 90 tablet 1    ONETOUCH VERIO strip Inject 1 each into the skin daily 100 each 1     No current facility-administered medications on file prior to encounter. General health:  Fairly good. No fever or chills. Skin:  No itching, redness or rash.                  HEENT:  No headache, epistaxis or sore throat. Neck:  No pain, stiffness or masses. Cardiovascular/Respiratory system:  No chest pain, palpitations or shortness of breath. Gastrointestinal tract: No abdominal pain, nausea, vomiting, diarrhea or constipation. Genitourinary:  No burning on micturition. No hesitancy, urgency, frequency or discoloration of urine. Musculoskeletal:  See HPI. Neuropsychiatric:  No referable complaints. GENERAL PHYSICAL EXAM:     Vitals: /79   Pulse 91   Temp 98.2 °F (36.8 °C)   Resp 20   Ht 5' 8.5\" (1.74 m)   Wt 194 lb (88 kg)   SpO2 98%   BMI 29.07 kg/m²  Body mass index is 29.07 kg/m². GENERAL APPEARANCE:   Joey Martin is 46 y.o.,  female, moderately obese, nourished, conscious, alert. Does not appear to be distress or pain at this time. SKIN:  Warm, dry, no cyanosis or jaundice. HEAD:  Normocephalic, atraumatic, no swelling or tenderness. EYES:  Pupils equal, reactive to light. EARS:  No discharge, no marked hearing loss. NOSE:  No rhinorrhea, epistaxis or septal deformity. THROAT:  Not congested. No ulceration bleeding or discharge. NECK:  No stiffness, trachea central.                  CHEST:  Symmetrical and equal on expansion. HEART:  RRR S1 > S2. No audible murmurs or gallops. LUNGS:  Equal on expansion, normal breath sounds. ABDOMEN:  Obese. Soft on palpation. No localized tenderness. LYMPHATICS:  No palpable cervical lymphadenopathy. LOCOMOTOR, BACK AND SPINE:  No tenderness or deformities. EXTREMITIES:  Tenderness to palpation of the left anterior deltoid, left bicipital groove. Palpable crepitus with forward flexion to 90 degrees. Pain with forward flexion and pronation of left hand. Able to reach behind with left arm. Forward flexion to 120 degrees with pain. Symmetrical, trace pedal edema. No calf tenderness. NEUROLOGIC:  The patient is conscious, alert, oriented, No apparent focal sensory or motor deficits.                                                                                    PROVISIONAL DIAGNOSES / SURGERY:      Left Shoulder Rotator Cuff Tear  Left Shoulder Arthroscopy w/Rotator Cuff Repair     LORNA Goff - CNP on 5/13/2019 at 1:37 PM 1 Principal Discharge DX:	Closed head injury  Secondary Diagnosis:	Contusion of right arm   Principal Discharge DX:	Closed head injury  Secondary Diagnosis:	Contusion of right arm  Secondary Diagnosis:	Scaphoid fracture

## (undated) DEVICE — GOWN,AURORA,NONREINFORCED,LARGE: Brand: MEDLINE

## (undated) DEVICE — DRESSING,GAUZE,XEROFORM,CURAD,1"X8",ST: Brand: CURAD

## (undated) DEVICE — NEEDLE SUT PASS FOR ROT CUF LABRAL REP MULTFI SCORPION

## (undated) DEVICE — NEEDLE SPNL 18GA L3.5IN W/ QNCKE SHARPER BVL DURA CLICK

## (undated) DEVICE — 4-PORT MANIFOLD: Brand: NEPTUNE 2

## (undated) DEVICE — DRESSING TRNSPAR W5XL4.5IN FLM SHT SEMIPERMEABLE WIND

## (undated) DEVICE — POUCH FLD 36X29IN SHLDR HVY LO GLARE MATTE FINISH FLM 2 SUCT

## (undated) DEVICE — TUBING, SUCTION, 3/16" X 10', STRAIGHT: Brand: MEDLINE

## (undated) DEVICE — STRAP,POSITIONING,KNEE/BODY,FOAM,4X60": Brand: MEDLINE

## (undated) DEVICE — SYRINGE MED 50ML LUERLOCK TIP

## (undated) DEVICE — GLOVE ORANGE PI 7 1/2   MSG9075

## (undated) DEVICE — 3M™ IOBAN™ 2 ANTIMICROBIAL INCISE DRAPE 6650EZ: Brand: IOBAN™ 2

## (undated) DEVICE — POUCH INSTR W6.75XL11.5IN FRST 2 PKT ADH FOR ORTH AND

## (undated) DEVICE — GOWN,SIRUS,NONRNF,SETINSLV,XL,20/CS: Brand: MEDLINE

## (undated) DEVICE — SUTURE SUTTAPE L40IN DIA1.3MM NONABSORBABLE WHT BLU L26.5MM AR7500

## (undated) DEVICE — SOLUTION IV IRRIG WATER 1000ML POUR BRL 2F7114

## (undated) DEVICE — [ARTHROSCOPY PUMP,  DO NOT USE IF PACKAGE IS DAMAGED,  KEEP DRY,  KEEP AWAY FROM SUNLIGHT,  PROTECT FROM HEAT AND RADIOACTIVE SOURCES.]: Brand: FLOSTEADY

## (undated) DEVICE — SUTURE PROL SZ 3-0 L18IN NONABSORBABLE BLU L30MM FS-1 3/8 8663G

## (undated) DEVICE — Device

## (undated) DEVICE — DRAPE,REIN 53X77,STERILE: Brand: MEDLINE

## (undated) DEVICE — KIT POS ULT SHLDR PT CARE REHAB SLNG

## (undated) DEVICE — SPONGE LAP W18XL18IN WHT COT 4 PLY FLD STRUNG RADPQ DISP ST

## (undated) DEVICE — 3M™ WARMING BLANKET, LOWER BODY, 10 PER CASE, 42568: Brand: BAIR HUGGER™

## (undated) DEVICE — DISC ABSRB YEL FLR POLYETH MGMT SUCT QUICKSUITE

## (undated) DEVICE — IMMOBILIZER SHLDR L10.5-17IN D7IN SLNG W/ 15DEG ABD PLLW

## (undated) DEVICE — GLOVE ORANGE PI 7   MSG9070

## (undated) DEVICE — 1010 S-DRAPE TOWEL DRAPE 10/BX: Brand: STERI-DRAPE™

## (undated) DEVICE — BANDAGE,SELF ADHRNT,COFLEX,4"X5YD,STRL: Brand: COLABEL

## (undated) DEVICE — SOLUTION IV IRRIG POUR BRL 0.9% SODIUM CHL 2F7124

## (undated) DEVICE — COVER,MAYO STAND,XL,STERILE: Brand: MEDLINE

## (undated) DEVICE — GAUZE,SPONGE,FLUFF,6"X6.75",STRL,5/TRAY: Brand: MEDLINE

## (undated) DEVICE — SHEET, ORTHO, SPLIT, STERILE: Brand: MEDLINE

## (undated) DEVICE — SOLUTION IV IRRIG LACTATED RINGERS 3000ML 2B7487

## (undated) DEVICE — CANNULA ARTHSCP L3CM ID8MM DBL DAM 1 PC MOLD LO PROF FLNG

## (undated) DEVICE — 90-S, SUCTION PROBE, NON-BENDABLE, MAX CUT LEVEL 11: Brand: SERFAS ENERGY

## (undated) DEVICE — BLADE SAW RESECTOR CUT 4MM

## (undated) DEVICE — 3M™ STERI-DRAPE™ U-DRAPE 1015: Brand: STERI-DRAPE™